# Patient Record
Sex: MALE | Race: WHITE | NOT HISPANIC OR LATINO | Employment: FULL TIME | URBAN - NONMETROPOLITAN AREA
[De-identification: names, ages, dates, MRNs, and addresses within clinical notes are randomized per-mention and may not be internally consistent; named-entity substitution may affect disease eponyms.]

---

## 2021-01-27 ENCOUNTER — OFFICE VISIT (OUTPATIENT)
Dept: INTERNAL MEDICINE | Facility: CLINIC | Age: 41
End: 2021-01-27

## 2021-01-27 VITALS
TEMPERATURE: 98.3 F | HEIGHT: 70 IN | WEIGHT: 268.2 LBS | HEART RATE: 73 BPM | BODY MASS INDEX: 38.39 KG/M2 | DIASTOLIC BLOOD PRESSURE: 87 MMHG | OXYGEN SATURATION: 100 % | SYSTOLIC BLOOD PRESSURE: 152 MMHG | RESPIRATION RATE: 18 BRPM

## 2021-01-27 DIAGNOSIS — E78.2 MIXED HYPERLIPIDEMIA: ICD-10-CM

## 2021-01-27 DIAGNOSIS — E05.90 HYPERTHYROIDISM: Primary | ICD-10-CM

## 2021-01-27 DIAGNOSIS — E04.2 MULTIPLE THYROID NODULES: ICD-10-CM

## 2021-01-27 DIAGNOSIS — D58.2 ELEVATED HEMOGLOBIN (HCC): ICD-10-CM

## 2021-01-27 PROCEDURE — 99204 OFFICE O/P NEW MOD 45 MIN: CPT | Performed by: FAMILY MEDICINE

## 2021-01-27 NOTE — PROGRESS NOTES
"        Subjective     Chief Complaint   Patient presents with   • Hyperthyroidism     Establishing care.        History of Present Illness  Patient is new to the clinic.  He is recently moved from [place] to the [facility].  Overall he feels fine.  He does have a history of subclinical hyperthyroidism with thyroid nodules.  Otherwise he has no known medical problems.  Both of his parents have hypothyroidism.  They are otherwise healthy.  Patient notes he has gained weight since he has been working from home due to this Covid pandemic.  He has previously followed in the past for his hyperthyroidism with an endocrinologist, however; there is not one closely available and he would like to follow with primary care for this unless something occurs that he needs to see an endocrinologist  Patient's PMR from outside medical facility reviewed and noted.    Review of Systems     Otherwise complete ROS reviewed and negative except as mentioned in the HPI.    Past Medical History:   Past Medical History:   Diagnosis Date   • Hyperthyroidism      Past Surgical History:  Past Surgical History:   Procedure Laterality Date   • KNEE ACL RECONSTRUCTION Right    • SINUS SURGERY     • TONSILLECTOMY       Social History:  reports that he quit smoking about 17 years ago. His smoking use included cigarettes. He has a 1.50 pack-year smoking history. He quit smokeless tobacco use about 17 years ago.  His smokeless tobacco use included snuff. He reports current alcohol use. He reports that he does not use drugs.    Family History: family history includes Hypothyroidism in his father and mother.       Allergies:  Allergies   Allergen Reactions   • Penicillins Hives     Medications:  Prior to Admission medications    Not on File       Objective     Vital Signs: /87 (BP Location: Right arm, Patient Position: Sitting, Cuff Size: Large Adult)   Pulse 73   Temp 98.3 °F (36.8 °C) (Skin)   Resp 18   Ht 177.8 cm (70\")   Wt 122 kg (268 " lb 3.2 oz)   SpO2 100%   BMI 38.48 kg/m²   Physical Exam  Vitals signs and nursing note reviewed.   Constitutional:       General: He is not in acute distress.     Appearance: Normal appearance. He is obese. He is not ill-appearing.   HENT:      Head: Normocephalic and atraumatic.      Right Ear: Tympanic membrane, ear canal and external ear normal.      Left Ear: Tympanic membrane, ear canal and external ear normal.      Nose: Nose normal. No congestion.      Mouth/Throat:      Mouth: Mucous membranes are moist.      Pharynx: Oropharynx is clear.   Eyes:      General: No scleral icterus.     Extraocular Movements: Extraocular movements intact.      Conjunctiva/sclera: Conjunctivae normal.      Pupils: Pupils are equal, round, and reactive to light.   Neck:      Musculoskeletal: Normal range of motion and neck supple.   Cardiovascular:      Rate and Rhythm: Normal rate and regular rhythm.      Pulses: Normal pulses.      Heart sounds: Normal heart sounds. No murmur. No friction rub. No gallop.    Pulmonary:      Effort: Pulmonary effort is normal.      Breath sounds: Normal breath sounds.   Abdominal:      General: Bowel sounds are normal. There is no distension.      Palpations: Abdomen is soft.   Musculoskeletal: Normal range of motion.         General: No swelling.      Right lower leg: No edema.      Left lower leg: Edema present.   Skin:     General: Skin is warm and dry.      Capillary Refill: Capillary refill takes less than 2 seconds.   Neurological:      General: No focal deficit present.      Mental Status: He is alert and oriented to person, place, and time.      Deep Tendon Reflexes: Reflexes normal.   Psychiatric:         Mood and Affect: Mood normal.         Behavior: Behavior normal.         Thought Content: Thought content normal.         Judgment: Judgment normal.         Patient's Body mass index is 38.48 kg/m². BMI is above normal parameters. Recommendations include: exercise counseling and  nutrition counseling.      Results Reviewed:  No results found for: GLUCOSE, BUN, CREATININE, NA, K, CL, CO2, CALCIUM, ALT, AST, WBC, HCT, PLT, CHOL, TRIG, HDL, LDL, LDLHDL, HGBA1C      Assessment / Plan     Assessment/Plan:  1. Hyperthyroidism    - TSH; Future  - T4; Future    2. Elevated hemoglobin (CMS/HCC)    - CBC w AUTO Differential; Future    3. Mixed hyperlipidemia    - Comprehensive metabolic panel; Future  - Lipid panel; Future    4. Multiple thyroid nodules    - US Thyroid        Return in about 6 months (around 7/27/2021). unless patient needs to be seen sooner or acute issues arise    I have discussed the patient results/orders and and plan/recommendation with them at today's visit.      Terese Brambila, DO   01/27/2021

## 2021-02-01 ENCOUNTER — CLINICAL SUPPORT (OUTPATIENT)
Dept: INTERNAL MEDICINE | Facility: CLINIC | Age: 41
End: 2021-02-01

## 2021-02-01 DIAGNOSIS — E05.90 HYPERTHYROIDISM: ICD-10-CM

## 2021-02-01 DIAGNOSIS — E78.2 MIXED HYPERLIPIDEMIA: ICD-10-CM

## 2021-02-01 DIAGNOSIS — D58.2 ELEVATED HEMOGLOBIN (HCC): ICD-10-CM

## 2021-02-01 PROCEDURE — 36415 COLL VENOUS BLD VENIPUNCTURE: CPT | Performed by: NURSE PRACTITIONER

## 2021-02-01 NOTE — PROGRESS NOTES
Venipuncture Blood Specimen Collection  Venipuncture performed in left arm by Maria Antonia Olson CMA with good hemostasis. Patient tolerated the procedure well without complications.   02/01/21   Maria Antonia Olson CMA

## 2021-02-02 ENCOUNTER — TELEPHONE (OUTPATIENT)
Dept: INTERNAL MEDICINE | Facility: CLINIC | Age: 41
End: 2021-02-02

## 2021-02-02 LAB
ALBUMIN SERPL-MCNC: 4.5 G/DL (ref 4–5)
ALBUMIN/GLOB SERPL: 2 {RATIO} (ref 1.2–2.2)
ALP SERPL-CCNC: 69 IU/L (ref 39–117)
ALT SERPL-CCNC: 17 IU/L (ref 0–44)
AST SERPL-CCNC: 17 IU/L (ref 0–40)
BASOPHILS # BLD AUTO: 0.1 X10E3/UL (ref 0–0.2)
BASOPHILS NFR BLD AUTO: 1 %
BILIRUB SERPL-MCNC: 0.8 MG/DL (ref 0–1.2)
BUN SERPL-MCNC: 11 MG/DL (ref 6–20)
BUN/CREAT SERPL: 11 (ref 9–20)
CALCIUM SERPL-MCNC: 9.3 MG/DL (ref 8.7–10.2)
CHLORIDE SERPL-SCNC: 103 MMOL/L (ref 96–106)
CHOLEST SERPL-MCNC: 183 MG/DL (ref 100–199)
CO2 SERPL-SCNC: 25 MMOL/L (ref 20–29)
CREAT SERPL-MCNC: 1 MG/DL (ref 0.76–1.27)
EOSINOPHIL # BLD AUTO: 0.1 X10E3/UL (ref 0–0.4)
EOSINOPHIL NFR BLD AUTO: 1 %
ERYTHROCYTE [DISTWIDTH] IN BLOOD BY AUTOMATED COUNT: 12.7 % (ref 11.6–15.4)
GLOBULIN SER CALC-MCNC: 2.2 G/DL (ref 1.5–4.5)
GLUCOSE SERPL-MCNC: 90 MG/DL (ref 65–99)
HCT VFR BLD AUTO: 48.9 % (ref 37.5–51)
HDLC SERPL-MCNC: 55 MG/DL
HGB BLD-MCNC: 16.4 G/DL (ref 13–17.7)
IMM GRANULOCYTES # BLD AUTO: 0 X10E3/UL (ref 0–0.1)
IMM GRANULOCYTES NFR BLD AUTO: 1 %
LDLC SERPL CALC-MCNC: 116 MG/DL (ref 0–99)
LYMPHOCYTES # BLD AUTO: 1.7 X10E3/UL (ref 0.7–3.1)
LYMPHOCYTES NFR BLD AUTO: 35 %
MCH RBC QN AUTO: 30.1 PG (ref 26.6–33)
MCHC RBC AUTO-ENTMCNC: 33.5 G/DL (ref 31.5–35.7)
MCV RBC AUTO: 90 FL (ref 79–97)
MONOCYTES # BLD AUTO: 0.4 X10E3/UL (ref 0.1–0.9)
MONOCYTES NFR BLD AUTO: 9 %
NEUTROPHILS # BLD AUTO: 2.6 X10E3/UL (ref 1.4–7)
NEUTROPHILS NFR BLD AUTO: 53 %
PLATELET # BLD AUTO: 209 X10E3/UL (ref 150–450)
POTASSIUM SERPL-SCNC: 4.4 MMOL/L (ref 3.5–5.2)
PROT SERPL-MCNC: 6.7 G/DL (ref 6–8.5)
RBC # BLD AUTO: 5.45 X10E6/UL (ref 4.14–5.8)
SODIUM SERPL-SCNC: 141 MMOL/L (ref 134–144)
T4 SERPL-MCNC: 7.4 UG/DL (ref 4.5–12)
TRIGL SERPL-MCNC: 66 MG/DL (ref 0–149)
TSH SERPL DL<=0.005 MIU/L-ACNC: 0.47 UIU/ML (ref 0.45–4.5)
VLDLC SERPL CALC-MCNC: 12 MG/DL (ref 5–40)
WBC # BLD AUTO: 4.9 X10E3/UL (ref 3.4–10.8)

## 2021-02-02 NOTE — TELEPHONE ENCOUNTER
----- Message from Terese Brambila DO sent at 2/2/2021 12:25 PM CST -----  Several small nodules in the thyroid gland as described above. None  of these meet criteria for biopsy. Follow-up ultrasound is recommended  in 12 months.

## 2021-02-02 NOTE — TELEPHONE ENCOUNTER
Called patient to discuss thyroid US results and recommendations. Patient voiced understanding and had no further questions.

## 2021-12-07 ENCOUNTER — OFFICE VISIT (OUTPATIENT)
Dept: INTERNAL MEDICINE | Facility: CLINIC | Age: 41
End: 2021-12-07

## 2021-12-07 VITALS
WEIGHT: 271.3 LBS | BODY MASS INDEX: 38.84 KG/M2 | SYSTOLIC BLOOD PRESSURE: 150 MMHG | OXYGEN SATURATION: 99 % | RESPIRATION RATE: 18 BRPM | DIASTOLIC BLOOD PRESSURE: 89 MMHG | HEART RATE: 94 BPM | HEIGHT: 70 IN | TEMPERATURE: 98.5 F

## 2021-12-07 DIAGNOSIS — R13.19 ESOPHAGEAL DYSPHAGIA: Primary | ICD-10-CM

## 2021-12-07 DIAGNOSIS — R03.0 ELEVATED BLOOD PRESSURE READING WITHOUT DIAGNOSIS OF HYPERTENSION: ICD-10-CM

## 2021-12-07 PROCEDURE — 99213 OFFICE O/P EST LOW 20 MIN: CPT | Performed by: FAMILY MEDICINE

## 2021-12-07 RX ORDER — PANTOPRAZOLE SODIUM 40 MG/1
40 TABLET, DELAYED RELEASE ORAL DAILY
Qty: 30 TABLET | Refills: 3 | Status: SHIPPED | OUTPATIENT
Start: 2021-12-07 | End: 2022-03-03 | Stop reason: SDUPTHER

## 2021-12-07 NOTE — PROGRESS NOTES
"        Subjective     Chief Complaint   Patient presents with   • Difficulty Swallowing     Symptoms began one year ago.        History of Present Illness  Difficulty swallowing.  Patient notes he has had problems off and on with this.  Has not had any scopes done in recent years.   The symptoms are worsening.   He is not eating as much due to symptoms.   Feels like he is actually choking and has to cough food up.   BP at home 130/74 usually stays in that range.  Patient's PMR from outside medical facility reviewed and noted.    Review of Systems     Otherwise complete ROS reviewed and negative except as mentioned in the HPI.    Past Medical History:   Past Medical History:   Diagnosis Date   • Hyperthyroidism      Past Surgical History:  Past Surgical History:   Procedure Laterality Date   • KNEE ACL RECONSTRUCTION Right    • SINUS SURGERY     • TONSILLECTOMY       Social History:  reports that he quit smoking about 17 years ago. His smoking use included cigarettes. He has a 1.50 pack-year smoking history. He quit smokeless tobacco use about 17 years ago.  His smokeless tobacco use included snuff. He reports current alcohol use. He reports that he does not use drugs.    Family History: family history includes Hypothyroidism in his father and mother.       Allergies:  Allergies   Allergen Reactions   • Penicillins Hives     Medications:  Prior to Admission medications    Not on File       Objective     Vital Signs: /89 (BP Location: Right arm, Patient Position: Sitting, Cuff Size: Large Adult)   Pulse 94   Temp 98.5 °F (36.9 °C) (Skin)   Resp 18   Ht 177.8 cm (70\")   Wt 123 kg (271 lb 4.8 oz)   SpO2 99%   BMI 38.93 kg/m²   Physical Exam  Vitals and nursing note reviewed.   Constitutional:       General: He is not in acute distress.     Appearance: Normal appearance. He is obese. He is not ill-appearing.   HENT:      Head: Normocephalic and atraumatic.      Right Ear: Tympanic membrane, ear canal and " Telephone Encounter by Curt Cardenas DO at 05/04/18 03:47 PM     Author:  Curt Cardenas DO Service:  (none) Author Type:  Physician     Filed:  05/04/18 03:47 PM Encounter Date:  5/4/2018 Status:  Signed     :  Curt Cardenas DO (Physician)            Echocardiogram shows mildly dilated LV  At least moderate eccentric AI, unchanged from before[FH1.1M]    Electronically Signed by:    Curt Cardenas DO , 5/4/2018[FH1.1T]        Revision History        User Key Date/Time User Provider Type Action    > FH1.1 05/04/18 03:47 PM Curt Cardenas DO Physician Sign    M - Manual, T - Template             external ear normal.      Left Ear: Tympanic membrane, ear canal and external ear normal.      Nose: Nose normal.      Mouth/Throat:      Mouth: Mucous membranes are moist.      Pharynx: Oropharynx is clear.   Eyes:      Extraocular Movements: Extraocular movements intact.      Conjunctiva/sclera: Conjunctivae normal.      Pupils: Pupils are equal, round, and reactive to light.   Cardiovascular:      Rate and Rhythm: Normal rate.      Pulses: Normal pulses.      Heart sounds: Normal heart sounds.   Pulmonary:      Effort: Pulmonary effort is normal.      Breath sounds: Normal breath sounds.   Abdominal:      General: Bowel sounds are normal. There is no distension.      Palpations: Abdomen is soft.      Tenderness: There is no abdominal tenderness.   Musculoskeletal:         General: Normal range of motion.      Cervical back: Normal range of motion.   Skin:     General: Skin is warm and dry.      Capillary Refill: Capillary refill takes less than 2 seconds.   Neurological:      General: No focal deficit present.      Mental Status: He is alert and oriented to person, place, and time.   Psychiatric:         Mood and Affect: Mood normal.         Behavior: Behavior normal.         Patient's Body mass index is 38.93 kg/m².      Results Reviewed:  Glucose   Date Value Ref Range Status   02/01/2021 90 65 - 99 mg/dL Final     BUN   Date Value Ref Range Status   02/01/2021 11 6 - 20 mg/dL Final     Creatinine   Date Value Ref Range Status   02/01/2021 1.00 0.76 - 1.27 mg/dL Final     Sodium   Date Value Ref Range Status   02/01/2021 141 134 - 144 mmol/L Final     Potassium   Date Value Ref Range Status   02/01/2021 4.4 3.5 - 5.2 mmol/L Final     Chloride   Date Value Ref Range Status   02/01/2021 103 96 - 106 mmol/L Final     Total CO2   Date Value Ref Range Status   02/01/2021 25 20 - 29 mmol/L Final     Calcium   Date Value Ref Range Status   02/01/2021 9.3 8.7 - 10.2 mg/dL Final     ALT (SGPT)   Date Value Ref Range  Status   02/01/2021 17 0 - 44 IU/L Final     AST (SGOT)   Date Value Ref Range Status   02/01/2021 17 0 - 40 IU/L Final     WBC   Date Value Ref Range Status   02/01/2021 4.9 3.4 - 10.8 x10E3/uL Final     Hematocrit   Date Value Ref Range Status   02/01/2021 48.9 37.5 - 51.0 % Final     Platelets   Date Value Ref Range Status   02/01/2021 209 150 - 450 x10E3/uL Final     Triglycerides   Date Value Ref Range Status   02/01/2021 66 0 - 149 mg/dL Final     HDL Cholesterol   Date Value Ref Range Status   02/01/2021 55 >39 mg/dL Final     LDL Chol Calc (NIH)   Date Value Ref Range Status   02/01/2021 116 (H) 0 - 99 mg/dL Final         Assessment / Plan     Assessment/Plan:  1. Esophageal dysphagia    - Ambulatory Referral to Gastroenterology  Add protonix 40 mg po qd  If food gets stuck go to [facility]    2. Elevated blood pressure reading without diagnosis of hypertension  Monitor bp at home   Goal less than 130/80        Return in about 4 weeks (around 1/4/2022). unless patient needs to be seen sooner or acute issues arise.        I have discussed the patient results/orders and and plan/recommendation with them at today's visit.      Terese Brambila,    12/07/2021

## 2021-12-16 ENCOUNTER — OFFICE VISIT (OUTPATIENT)
Dept: GASTROENTEROLOGY | Facility: CLINIC | Age: 41
End: 2021-12-16

## 2021-12-16 VITALS
SYSTOLIC BLOOD PRESSURE: 140 MMHG | OXYGEN SATURATION: 99 % | DIASTOLIC BLOOD PRESSURE: 70 MMHG | WEIGHT: 273 LBS | HEART RATE: 81 BPM | HEIGHT: 70 IN | TEMPERATURE: 97.8 F | BODY MASS INDEX: 39.08 KG/M2

## 2021-12-16 DIAGNOSIS — Z78.9 NONSMOKER: ICD-10-CM

## 2021-12-16 DIAGNOSIS — R13.19 ESOPHAGEAL DYSPHAGIA: Primary | ICD-10-CM

## 2021-12-16 DIAGNOSIS — E66.9 OBESITY, UNSPECIFIED OBESITY SEVERITY, UNSPECIFIED OBESITY TYPE: ICD-10-CM

## 2021-12-16 PROCEDURE — 99214 OFFICE O/P EST MOD 30 MIN: CPT | Performed by: CLINICAL NURSE SPECIALIST

## 2021-12-16 NOTE — PROGRESS NOTES
Moe Meléndez  2021  Chief Complaint   Patient presents with   • GI Problem     New patient ref by Dr. Brambila for problems swallowing     Subjective   HPI  Moe Meléndez is a 41 y.o. male who presents with a complaint of difficulty swallowing solids progressive ongoing over the past year. It has been moderate to severe. He has been put on Protonix and that has seemed to help as well as with liquids. No nausea or vomiting. No wt loss. NO fever chills or sweats. No melena. No fever.    Past Medical History:   Diagnosis Date   • Hyperthyroidism      Past Surgical History:   Procedure Laterality Date   • KNEE ACL RECONSTRUCTION Right    • SINUS SURGERY     • TONSILLECTOMY         Outpatient Medications Marked as Taking for the 21 encounter (Office Visit) with Reny Rosa APRN   Medication Sig Dispense Refill   • pantoprazole (Protonix) 40 MG EC tablet Take 1 tablet by mouth Daily. 30 tablet 3     Allergies   Allergen Reactions   • Penicillins Hives     Social History     Socioeconomic History   • Marital status:    Tobacco Use   • Smoking status: Former Smoker     Packs/day: 0.50     Years: 3.00     Pack years: 1.50     Types: Cigarettes     Quit date:      Years since quittin.9   • Smokeless tobacco: Former User     Types: Snuff     Quit date:    Substance and Sexual Activity   • Alcohol use: Yes     Alcohol/week: 0.0 standard drinks     Comment: Occassional, 1-2 cans per month   • Drug use: Never   • Sexual activity: Yes     Family History   Problem Relation Age of Onset   • Hypothyroidism Mother    • Colon polyps Mother    • Hypothyroidism Father    • Colon polyps Father    • Colon cancer Paternal Uncle    • Colon polyps Paternal Uncle      Health Maintenance   Topic Date Due   • ANNUAL PHYSICAL  Never done   • COVID-19 Vaccine (1) Never done   • TDAP/TD VACCINES (2 - Tdap) 2007   • HEPATITIS C SCREENING  Never done   • INFLUENZA VACCINE  2022 (Originally  "8/1/2021)   • LIPID PANEL  02/01/2022   • Pneumococcal Vaccine 0-64  Aged Out     Review of Systems   Constitutional: Negative for activity change, appetite change, chills, diaphoresis, fatigue, fever and unexpected weight change.   HENT: Positive for trouble swallowing. Negative for ear pain, hearing loss, mouth sores, sore throat and voice change.    Eyes: Negative.    Respiratory: Negative for cough, choking, shortness of breath and wheezing.    Cardiovascular: Negative for chest pain and palpitations.   Gastrointestinal: Negative for abdominal pain, blood in stool, constipation, diarrhea, nausea and vomiting.   Endocrine: Negative for cold intolerance and heat intolerance.   Genitourinary: Negative for decreased urine volume, dysuria, frequency, hematuria and urgency.   Musculoskeletal: Negative for back pain, gait problem and myalgias.   Skin: Negative for color change, pallor and rash.   Allergic/Immunologic: Negative for food allergies and immunocompromised state.   Neurological: Negative for dizziness, tremors, seizures, syncope, weakness, light-headedness, numbness and headaches.   Hematological: Negative for adenopathy. Does not bruise/bleed easily.   Psychiatric/Behavioral: Negative for agitation and confusion. The patient is not nervous/anxious.    All other systems reviewed and are negative.    Objective   Vitals:    12/16/21 0935   BP: 140/70   Pulse: 81   Temp: 97.8 °F (36.6 °C)   SpO2: 99%   Weight: 124 kg (273 lb)   Height: 177.8 cm (70\")     Body mass index is 39.17 kg/m².  Physical Exam  Constitutional:       Appearance: He is well-developed.   HENT:      Head: Normocephalic and atraumatic.   Eyes:      Pupils: Pupils are equal, round, and reactive to light.   Neck:      Trachea: No tracheal deviation.   Cardiovascular:      Rate and Rhythm: Normal rate and regular rhythm.      Heart sounds: Normal heart sounds. No murmur heard.  No friction rub. No gallop.    Pulmonary:      Effort: Pulmonary " effort is normal. No respiratory distress.      Breath sounds: Normal breath sounds. No wheezing or rales.   Chest:      Chest wall: No tenderness.   Abdominal:      General: Bowel sounds are normal. There is no distension.      Palpations: Abdomen is soft. Abdomen is not rigid.      Tenderness: There is no abdominal tenderness. There is no guarding or rebound.   Musculoskeletal:         General: No tenderness or deformity. Normal range of motion.      Cervical back: Normal range of motion and neck supple.   Skin:     General: Skin is warm and dry.      Coloration: Skin is not pale.      Findings: No rash.   Neurological:      Mental Status: He is alert and oriented to person, place, and time.      Deep Tendon Reflexes: Reflexes are normal and symmetric.   Psychiatric:         Behavior: Behavior normal.         Thought Content: Thought content normal.         Judgment: Judgment normal.       Assessment/Plan   Diagnoses and all orders for this visit:    1. Esophageal dysphagia (Primary)  -     Cancel: Case Request; Standing  -     Cancel: COVID PRE-OP / PRE-PROCEDURE SCREENING ORDER (NO ISOLATION) - Swab, Nasal Cavity; Future  -     Cancel: Follow Anesthesia Guidelines / Standing Orders; Future  -     Cancel: Obtain Informed Consent; Future  -     Cancel: Case Request  -     Case Request; Standing  -     COVID PRE-OP / PRE-PROCEDURE SCREENING ORDER (NO ISOLATION) - Swab, Nasopharynx; Future  -     Follow Anesthesia Guidelines / Standing Orders; Future  -     Obtain Informed Consent; Future  -     Case Request    2. Nonsmoker    3. Obesity, unspecified obesity severity, unspecified obesity type      ESOPHAGOGASTRODUODENOSCOPY WITH ANESTHESIA (N/A)  Part of this note may be an electronic transcription/translation of spoken language to printed text using the Dragon Dictation System.  Body mass index is 39.17 kg/m².  No follow-ups on file.    Patient's Body mass index is 39.17 kg/m². indicating that he is obese (BMI  >30). Obesity-related health conditions include the following: none. Obesity is unchanged. BMI is is above average; BMI management plan is completed. We discussed portion control and increasing exercise..      All risks, benefits, alternatives, and indications of colonoscopy and/or Endoscopy procedure have been discussed with the patient. Risks to include perforation of the colon requiring possible surgery or colostomy, risk of bleeding from biopsies or removal of colon tissue, possibility of missing a colon polyp or cancer, or adverse drug reaction.  Benefits to include the diagnosis and management of disease of the colon and rectum. Alternatives to include barium enema, radiographic evaluation, lab testing or no intervention. Pt verbalizes understanding and agrees.     Reny Rosa, APRN  12/16/2021  10:15 CST          If you smoke or use tobacco, 4 minutes reading provided  Steps to Quit Smoking  Smoking tobacco can be harmful to your health and can affect almost every organ in your body. Smoking puts you, and those around you, at risk for developing many serious chronic diseases. Quitting smoking is difficult, but it is one of the best things that you can do for your health. It is never too late to quit.  What are the benefits of quitting smoking?  When you quit smoking, you lower your risk of developing serious diseases and conditions, such as:  · Lung cancer or lung disease, such as COPD.  · Heart disease.  · Stroke.  · Heart attack.  · Infertility.  · Osteoporosis and bone fractures.  Additionally, symptoms such as coughing, wheezing, and shortness of breath may get better when you quit. You may also find that you get sick less often because your body is stronger at fighting off colds and infections. If you are pregnant, quitting smoking can help to reduce your chances of having a baby of low birth weight.  How do I get ready to quit?  When you decide to quit smoking, create a plan to make sure that  you are successful. Before you quit:  · Pick a date to quit. Set a date within the next two weeks to give you time to prepare.  · Write down the reasons why you are quitting. Keep this list in places where you will see it often, such as on your bathroom mirror or in your car or wallet.  · Identify the people, places, things, and activities that make you want to smoke (triggers) and avoid them. Make sure to take these actions:  ¨ Throw away all cigarettes at home, at work, and in your car.  ¨ Throw away smoking accessories, such as ashtrays and lighters.  ¨ Clean your car and make sure to empty the ashtray.  ¨ Clean your home, including curtains and carpets.  · Tell your family, friends, and coworkers that you are quitting. Support from your loved ones can make quitting easier.  · Talk with your health care provider about your options for quitting smoking.  · Find out what treatment options are covered by your health insurance.  What strategies can I use to quit smoking?  Talk with your healthcare provider about different strategies to quit smoking. Some strategies include:  · Quitting smoking altogether instead of gradually lessening how much you smoke over a period of time. Research shows that quitting “cold turkey” is more successful than gradually quitting.  · Attending in-person counseling to help you build problem-solving skills. You are more likely to have success in quitting if you attend several counseling sessions. Even short sessions of 10 minutes can be effective.  · Finding resources and support systems that can help you to quit smoking and remain smoke-free after you quit. These resources are most helpful when you use them often. They can include:  ¨ Online chats with a counselor.  ¨ Telephone quitlines.  ¨ Printed self-help materials.  ¨ Support groups or group counseling.  ¨ Text messaging programs.  ¨ Mobile phone applications.  · Taking medicines to help you quit smoking. (If you are pregnant or  breastfeeding, talk with your health care provider first.) Some medicines contain nicotine and some do not. Both types of medicines help with cravings, but the medicines that include nicotine help to relieve withdrawal symptoms. Your health care provider may recommend:  ¨ Nicotine patches, gum, or lozenges.  ¨ Nicotine inhalers or sprays.  ¨ Non-nicotine medicine that is taken by mouth.  Talk with your health care provider about combining strategies, such as taking medicines while you are also receiving in-person counseling. Using these two strategies together makes you more likely to succeed in quitting than if you used either strategy on its own.  If you are pregnant or breastfeeding, talk with your health care provider about finding counseling or other support strategies to quit smoking. Do not take medicine to help you quit smoking unless told to do so by your health care provider.  What things can I do to make it easier to quit?  Quitting smoking might feel overwhelming at first, but there is a lot that you can do to make it easier. Take these important actions:  · Reach out to your family and friends and ask that they support and encourage you during this time. Call telephone quitlines, reach out to support groups, or work with a counselor for support.  · Ask people who smoke to avoid smoking around you.  · Avoid places that trigger you to smoke, such as bars, parties, or smoke-break areas at work.  · Spend time around people who do not smoke.  · Lessen stress in your life, because stress can be a smoking trigger for some people. To lessen stress, try:  ¨ Exercising regularly.  ¨ Deep-breathing exercises.  ¨ Yoga.  ¨ Meditating.  ¨ Performing a body scan. This involves closing your eyes, scanning your body from head to toe, and noticing which parts of your body are particularly tense. Purposefully relax the muscles in those areas.  · Download or purchase mobile phone or tablet apps (applications) that can help  you stick to your quit plan by providing reminders, tips, and encouragement. There are many free apps, such as QuitGuide from the CDC (Centers for Disease Control and Prevention). You can find other support for quitting smoking (smoking cessation) through smokefree.gov and other websites.  How will I feel when I quit smoking?  Within the first 24 hours of quitting smoking, you may start to feel some withdrawal symptoms. These symptoms are usually most noticeable 2-3 days after quitting, but they usually do not last beyond 2-3 weeks. Changes or symptoms that you might experience include:  · Mood swings.  · Restlessness, anxiety, or irritation.  · Difficulty concentrating.  · Dizziness.  · Strong cravings for sugary foods in addition to nicotine.  · Mild weight gain.  · Constipation.  · Nausea.  · Coughing or a sore throat.  · Changes in how your medicines work in your body.  · A depressed mood.  · Difficulty sleeping (insomnia).  After the first 2-3 weeks of quitting, you may start to notice more positive results, such as:  · Improved sense of smell and taste.  · Decreased coughing and sore throat.  · Slower heart rate.  · Lower blood pressure.  · Clearer skin.  · The ability to breathe more easily.  · Fewer sick days.  Quitting smoking is very challenging for most people. Do not get discouraged if you are not successful the first time. Some people need to make many attempts to quit before they achieve long-term success. Do your best to stick to your quit plan, and talk with your health care provider if you have any questions or concerns.  This information is not intended to replace advice given to you by your health care provider. Make sure you discuss any questions you have with your health care provider.  Document Released: 12/12/2002 Document Revised: 08/15/2017 Document Reviewed: 05/03/2016  [employer] Interactive Patient Education © 2017 [employer]

## 2021-12-31 ENCOUNTER — LAB (OUTPATIENT)
Dept: LAB | Facility: HOSPITAL | Age: 41
End: 2021-12-31

## 2021-12-31 DIAGNOSIS — R13.19 ESOPHAGEAL DYSPHAGIA: ICD-10-CM

## 2021-12-31 LAB — SARS-COV-2 ORF1AB RESP QL NAA+PROBE: NOT DETECTED

## 2021-12-31 PROCEDURE — C9803 HOPD COVID-19 SPEC COLLECT: HCPCS

## 2021-12-31 PROCEDURE — U0004 COV-19 TEST NON-CDC HGH THRU: HCPCS

## 2022-01-03 ENCOUNTER — HOSPITAL ENCOUNTER (OUTPATIENT)
Facility: HOSPITAL | Age: 42
Setting detail: HOSPITAL OUTPATIENT SURGERY
Discharge: HOME | End: 2022-01-03
Attending: INTERNAL MEDICINE | Admitting: INTERNAL MEDICINE

## 2022-01-03 ENCOUNTER — ANESTHESIA (OUTPATIENT)
Dept: GASTROENTEROLOGY | Facility: HOSPITAL | Age: 42
End: 2022-01-03

## 2022-01-03 ENCOUNTER — ANESTHESIA EVENT (OUTPATIENT)
Dept: GASTROENTEROLOGY | Facility: HOSPITAL | Age: 42
End: 2022-01-03

## 2022-01-03 VITALS
BODY MASS INDEX: 39.37 KG/M2 | DIASTOLIC BLOOD PRESSURE: 78 MMHG | OXYGEN SATURATION: 98 % | HEART RATE: 77 BPM | TEMPERATURE: 97.6 F | WEIGHT: 275 LBS | HEIGHT: 70 IN | RESPIRATION RATE: 16 BRPM | SYSTOLIC BLOOD PRESSURE: 128 MMHG

## 2022-01-03 DIAGNOSIS — R13.19 ESOPHAGEAL DYSPHAGIA: ICD-10-CM

## 2022-01-03 PROCEDURE — 88305 TISSUE EXAM BY PATHOLOGIST: CPT | Performed by: INTERNAL MEDICINE

## 2022-01-03 PROCEDURE — 43239 EGD BIOPSY SINGLE/MULTIPLE: CPT | Performed by: INTERNAL MEDICINE

## 2022-01-03 PROCEDURE — 25010000002 PROPOFOL 10 MG/ML EMULSION: Performed by: NURSE ANESTHETIST, CERTIFIED REGISTERED

## 2022-01-03 RX ORDER — SODIUM CHLORIDE 0.9 % (FLUSH) 0.9 %
10 SYRINGE (ML) INJECTION AS NEEDED
Status: DISCONTINUED | OUTPATIENT
Start: 2022-01-03 | End: 2022-01-03 | Stop reason: HOSPADM

## 2022-01-03 RX ORDER — PROPOFOL 10 MG/ML
VIAL (ML) INTRAVENOUS AS NEEDED
Status: DISCONTINUED | OUTPATIENT
Start: 2022-01-03 | End: 2022-01-03 | Stop reason: SURG

## 2022-01-03 RX ORDER — LIDOCAINE HYDROCHLORIDE 10 MG/ML
0.5 INJECTION, SOLUTION EPIDURAL; INFILTRATION; INTRACAUDAL; PERINEURAL ONCE AS NEEDED
Status: CANCELLED | OUTPATIENT
Start: 2022-01-03

## 2022-01-03 RX ORDER — SODIUM CHLORIDE 9 MG/ML
500 INJECTION, SOLUTION INTRAVENOUS CONTINUOUS PRN
Status: DISCONTINUED | OUTPATIENT
Start: 2022-01-03 | End: 2022-01-03 | Stop reason: HOSPADM

## 2022-01-03 RX ORDER — LIDOCAINE HYDROCHLORIDE 20 MG/ML
INJECTION, SOLUTION EPIDURAL; INFILTRATION; INTRACAUDAL; PERINEURAL AS NEEDED
Status: DISCONTINUED | OUTPATIENT
Start: 2022-01-03 | End: 2022-01-03 | Stop reason: SURG

## 2022-01-03 RX ADMIN — LIDOCAINE HYDROCHLORIDE 100 MG: 20 INJECTION, SOLUTION EPIDURAL; INFILTRATION; INTRACAUDAL; PERINEURAL at 07:46

## 2022-01-03 RX ADMIN — PROPOFOL 40 MG: 10 INJECTION, EMULSION INTRAVENOUS at 07:46

## 2022-01-03 RX ADMIN — PROPOFOL 40 MG: 10 INJECTION, EMULSION INTRAVENOUS at 07:50

## 2022-01-03 RX ADMIN — PROPOFOL 200 MG: 10 INJECTION, EMULSION INTRAVENOUS at 07:45

## 2022-01-03 RX ADMIN — PROPOFOL 40 MG: 10 INJECTION, EMULSION INTRAVENOUS at 07:52

## 2022-01-03 RX ADMIN — SODIUM CHLORIDE 500 ML: 9 INJECTION, SOLUTION INTRAVENOUS at 07:27

## 2022-01-03 RX ADMIN — PROPOFOL 40 MG: 10 INJECTION, EMULSION INTRAVENOUS at 07:48

## 2022-01-03 NOTE — ANESTHESIA PREPROCEDURE EVALUATION
Anesthesia Evaluation     Patient summary reviewed   no history of anesthetic complications:  NPO Solid Status: > 8 hours  NPO Liquid Status: > 8 hours           Airway   Mallampati: I  TM distance: >3 FB  Neck ROM: full  No difficulty expected  Dental - normal exam     Pulmonary - negative pulmonary ROS and normal exam   Cardiovascular - negative cardio ROS and normal exam  Exercise tolerance: excellent (>7 METS)        Neuro/Psych- negative ROS  GI/Hepatic/Renal/Endo    (+)  GERD poorly controlled,  thyroid problem hypothyroidism    Musculoskeletal (-) negative ROS    Abdominal  - normal exam   Substance History   (+) alcohol use,      OB/GYN          Other - negative ROS                       Anesthesia Plan    ASA 2     MAC     intravenous induction     Anesthetic plan, all risks, benefits, and alternatives have been provided, discussed and informed consent has been obtained with: patient.

## 2022-01-03 NOTE — ANESTHESIA POSTPROCEDURE EVALUATION
"Patient: Moe Meléndez    Procedure Summary     Date: 01/03/22 Room / Location: [facility] ENDOSCOPY 5 / [facility] END[facility]    Anesthesia Start: 0742 Anesthesia Stop: 0802    Procedure: ESOPHAGOGASTRODUODENOSCOPY WITH ANESTHESIA (N/A ) Diagnosis:       Esophageal dysphagia      (Esophageal dysphagia [R13.19])    Surgeons: Sakshi Navarro MD Provider: Reji Pagan CRNA    Anesthesia Type: MAC ASA Status: 2          Anesthesia Type: MAC    Vitals  Vitals Value Taken Time   /78 01/03/22 0816   Temp     Pulse 71 01/03/22 0818   Resp 16 01/03/22 0815   SpO2 99 % 01/03/22 0818   Vitals shown include unvalidated device data.        Post Anesthesia Care and Evaluation    Patient location during evaluation: PACU  Patient participation: complete - patient participated  Level of consciousness: awake and alert  Pain management: adequate  Airway patency: patent  Anesthetic complications: No anesthetic complications    Cardiovascular status: acceptable  Respiratory status: acceptable  Hydration status: acceptable    Comments: Blood pressure 128/78, pulse 77, temperature 97.6 °F (36.4 °C), temperature source Temporal, resp. rate 16, height 177.8 cm (70\"), weight 125 kg (275 lb), SpO2 98 %.    Pt discharged from PACU based on amari score >8      "

## 2022-01-04 LAB
CYTO UR: NORMAL
LAB AP CASE REPORT: NORMAL
PATH REPORT.FINAL DX SPEC: NORMAL
PATH REPORT.GROSS SPEC: NORMAL

## 2022-01-05 PROBLEM — K22.70 BARRETT'S ESOPHAGUS WITHOUT DYSPLASIA: Status: ACTIVE | Noted: 2022-01-05

## 2022-01-05 NOTE — PROGRESS NOTES
I am writing to inform you that biopsies from the esophagus did show changes that occur with long-standing reflux disease.  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  Thompson's disease occurs in those who suffer with acid reflux off and on for many years.     The importance of Thompson's disease is that there is a small risk of those individuals developing cancer of the esophagus later in life.  The risk is typically small, with the majority only having a 1/2-1% lifetime risk.  Therefore, it is important to treat your acid reflux with lifestyle modifications. This includes gradually losing weight to achieve ideal body weight, elevation of the head of bed by 4-6 inches, nothing to eat or drink 3 hours prior to lying down, avoiding tight clothing, stress reduction, tobacco cessation, reduction of alcohol intake, and dietary restrictions (avoiding caffeine, coffee, fatty foods, mints, chocolate, spicy foods and tomato based sauces as much as possible).  You should also take your acid reflux medicine as directed.    By doing the above measures, you will be able to help keep the acid from irritating and damaging your esophagus any further.  This will not absolute prevent you from developing cancer, but it does help.      Please call our office at [phone] if you have any questions or concerns.    Sincerely,        Sakshi Navarro MD

## 2022-03-03 ENCOUNTER — OFFICE VISIT (OUTPATIENT)
Dept: GASTROENTEROLOGY | Facility: CLINIC | Age: 42
End: 2022-03-03

## 2022-03-03 ENCOUNTER — TELEPHONE (OUTPATIENT)
Dept: GASTROENTEROLOGY | Facility: CLINIC | Age: 42
End: 2022-03-03

## 2022-03-03 VITALS
HEART RATE: 78 BPM | SYSTOLIC BLOOD PRESSURE: 132 MMHG | OXYGEN SATURATION: 99 % | DIASTOLIC BLOOD PRESSURE: 76 MMHG | HEIGHT: 70 IN | TEMPERATURE: 97.3 F | BODY MASS INDEX: 39.51 KG/M2 | WEIGHT: 276 LBS

## 2022-03-03 DIAGNOSIS — K22.70 BARRETT'S ESOPHAGUS WITHOUT DYSPLASIA: Primary | ICD-10-CM

## 2022-03-03 DIAGNOSIS — Z83.71 FAMILY HISTORY OF POLYPS IN THE COLON: ICD-10-CM

## 2022-03-03 PROBLEM — Z83.719 FAMILY HISTORY OF POLYPS IN THE COLON: Status: ACTIVE | Noted: 2022-03-03

## 2022-03-03 PROCEDURE — 99214 OFFICE O/P EST MOD 30 MIN: CPT | Performed by: INTERNAL MEDICINE

## 2022-03-03 RX ORDER — PANTOPRAZOLE SODIUM 40 MG/1
40 TABLET, DELAYED RELEASE ORAL DAILY
Qty: 90 TABLET | Refills: 3 | Status: SHIPPED | OUTPATIENT
Start: 2022-03-03 | End: 2022-08-30

## 2022-03-03 NOTE — PROGRESS NOTES
"Primary Physician: Terese Brambila DO    Chief Complaint   Patient presents with   • Follow-up     Pt presents today for dysphagia/Thompson's follow up-had endo 1/3/2022 that showed Thompson's; Pt states he hasn't had any problems swallowing since his procedure        Subjective     Moe Meléndez is a 41 y.o. male.    HPI   Thompson's esophagus  Endoscopy 2022 to evaluate dysphagia showed short segment Thompson's esophagus confirmed on histology. The edges were slightly \"heaped up\". As such I proceeded with biopsies rather than dilation. No malignancy seen. Patient on pantoprazole 40 mg daily.    Family history of colon polyps  Mother, father and borther have had colon polyps. His paternal uncle who has had colon polyps and colon cancer as well.  His last colonoscopy was done at [facility] in [place] in .  He was told he would need a repeat colonoscopy in .        Past Medical History:   Diagnosis Date   • Thompson's esophagus    • Family history of colon cancer    • Family history of colonic polyps    • GERD (gastroesophageal reflux disease)    • Hyperthyroidism        Past Surgical History:   Procedure Laterality Date   • ENDOSCOPY N/A 1/3/2022    Esophageal mucosal changes suspicious for short-segment Thompson's esophagus-biopsied; Small HH; Widely patent Schatzki ring; Normal stomach; Normal examined duodenum   • KNEE ACL RECONSTRUCTION Right    • SINUS SURGERY     • TONSILLECTOMY          Current Outpatient Medications:   •  pantoprazole (Protonix) 40 MG EC tablet, Take 1 tablet by mouth Daily., Disp: 90 tablet, Rfl: 3    Allergies   Allergen Reactions   • Penicillins Hives       Social History     Socioeconomic History   • Marital status:    Tobacco Use   • Smoking status: Former Smoker     Packs/day: 0.50     Years: 3.00     Pack years: 1.50     Types: Cigarettes     Quit date:      Years since quittin.1   • Smokeless tobacco: Former User     Types: Snuff     Quit date:    Vaping Use " "  • Vaping Use: Never used   Substance and Sexual Activity   • Alcohol use: Not Currently     Alcohol/week: 0.0 standard drinks   • Drug use: Never   • Sexual activity: Yes       Family History   Problem Relation Age of Onset   • Hypothyroidism Mother    • Colon polyps Mother         < 60 years of age   • Hypothyroidism Father    • Colon polyps Father         < 60 years of age   • Colon cancer Paternal Uncle         In his 40's   • Colon polyps Paternal Uncle         < 60 years of age   • Colon polyps Brother         < 60 years old   • Esophageal cancer Neg Hx    • Liver cancer Neg Hx    • Liver disease Neg Hx    • Rectal cancer Neg Hx    • Stomach cancer Neg Hx        Review of Systems   Respiratory: Negative for shortness of breath.    Cardiovascular: Negative for chest pain.       Objective     /76 (BP Location: Left arm, Patient Position: Sitting, Cuff Size: Adult)   Pulse 78   Temp 97.3 °F (36.3 °C) (Infrared)   Ht 177.8 cm (70\")   Wt 125 kg (276 lb)   SpO2 99%   BMI 39.60 kg/m²     Physical Exam  Constitutional:       Appearance: He is well-developed.   Pulmonary:      Effort: Pulmonary effort is normal.   Musculoskeletal:         General: Normal range of motion.   Skin:     General: Skin is warm.   Neurological:      Mental Status: He is alert and oriented to person, place, and time.   Psychiatric:         Behavior: Behavior normal.         Lab Results - Last 18 Months   Lab Units 02/01/21  0720   GLUCOSE mg/dL 90   BUN mg/dL 11   CREATININE mg/dL 1.00   SODIUM mmol/L 141   POTASSIUM mmol/L 4.4   CHLORIDE mmol/L 103   TOTAL CO2 mmol/L 25   ALBUMIN g/dL 4.5   ALT (SGPT) IU/L 17   AST (SGOT) IU/L 17   ALK PHOS IU/L 69   BILIRUBIN mg/dL 0.8       Lab Results - Last 18 Months   Lab Units 02/01/21  0720   HEMOGLOBIN g/dL 16.4   HEMATOCRIT % 48.9   MCV fL 90   WBC x10E3/uL 4.9   RDW % 12.7   PLATELETS x10E3/uL 209       Lab Results - Last 18 Months   Lab Units 02/01/21  0720   T4 TOTAL ug/dL 7.4   TSH " uIU/mL 0.468      Final Diagnosis   Distal esophagus, biopsies:  Mild chronic esophagogastritis with intestinal metaplasia.  No evidence of dysplasia or malignancy.   Electronically signed by Brodie Brewster MD on 1/4/2022 at 1213         IMPRESSION/PLAN:    Assessment/Plan      Problem List Items Addressed This Visit        Family History    Family history of polyps in the colon    Overview     Mother, father and borther have had colon polyps. His paternal uncle who has had colon polyps and colon cancer as well.  His last colonoscopy was done at [facility] in [place] in 2019.  He was told he would need a repeat colonoscopy in 5/2024.         Current Assessment & Plan                   Gastrointestinal Abdominal     Thompson's esophagus without dysplasia - Primary    Overview     Seen on endoscopy 1/2022 to assess dysphagia.  1 column of columnar epithelium seen extending upwards and proven to be Thompson's esophagus on biopsy.  I was concerned about the edges but biopsies were normal.  Will repeat endoscopy now that he has been on active medical treatment with pantoprazole to assure that all is well.    Anti-reflux measures were reviewed and discussed with patient.  They were advised to refrain from chocolate, alcohol, smoking, peppermint and caffeine.  They were advised to limit fatty foods, large meals, and eating late at nighttime.  They were advised to reach an ideal body mass index.             Current Assessment & Plan     The risks, benefits, and alternatives of endoscopy were reviewed with the patient today.  Risks including perforation, with or without dilation, possibly requiring surgery.  Additional risks include risk of bleeding.  There is also the risk of a drug reaction or problems with anesthesia.  This will be discussed with the further by the anesthesia team on the day of the procedure. The benefits include the diagnosis and management of disease of the upper digestive tract.  Alternatives to endoscopy  include upper GI series, laboratory testing, radiographic evaluation, or no intervention.  The patient verbalizes understanding and agrees.    In accordance with requirements under the Affordable Care Act, [facility] has provided pricing for all hospital services and items on each of its websites. However, a patient's actual cost may differ based on the services the patient receives to meet individual healthcare needs and based on the benefits provided under the patient’s insurance coverage.           Relevant Medications    pantoprazole (Protonix) 40 MG EC tablet    Other Relevant Orders    Case Request (Completed)    Follow Anesthesia Guidelines / Standing Orders        MEDICAL COMPLEXITY must have 2 out of 3   Moderate Complexity Level 4                                                                                                              1 of the following medical problems:                                                                                               []One chronic illness with mild exacerbation                                                                                [x]Two or more stable chronic illness                                                                                              []One new problem  []One acute illness with systemic symptoms     Complexity of Data  Reviewed (1 out of the 3 following categories)                                             Category 1 tests, documents, historian (must have 3 points)                                                      []Review of prior external records  []Review of results of unique tests  []Ordering unique tests   []Assessment requires an independent historian   Category 2 Interpretation of tests     []Independent interpretation of test read by another doc   Category 3 Discuss Management/tests  []Discussion with external physician     Risk of complications and/or morbidity                                                                                            [x]Prescription Drug Management     []Decision for elective endoscopic procedure                RTC 1 year      Sakshi Navarro MD  03/03/22  15:07 CST    Much of this encounter note is an electronic transcription/translation of spoken language to printed text. The electronic translation of spoken language may permit erroneous, or at times, nonsensical words or phrases to be inadvertently transcribed; although I have reviewed the note for such errors, some may still exist.

## 2022-03-03 NOTE — ASSESSMENT & PLAN NOTE
The risks, benefits, and alternatives of endoscopy were reviewed with the patient today.  Risks including perforation, with or without dilation, possibly requiring surgery.  Additional risks include risk of bleeding.  There is also the risk of a drug reaction or problems with anesthesia.  This will be discussed with the further by the anesthesia team on the day of the procedure. The benefits include the diagnosis and management of disease of the upper digestive tract.  Alternatives to endoscopy include upper GI series, laboratory testing, radiographic evaluation, or no intervention.  The patient verbalizes understanding and agrees.    In accordance with requirements under the Affordable Care Act, [facility] has provided pricing for all hospital services and items on each of its websites. However, a patient's actual cost may differ based on the services the patient receives to meet individual healthcare needs and based on the benefits provided under the patient’s insurance coverage.

## 2022-03-25 DIAGNOSIS — Z01.818 PREOPERATIVE TESTING: Primary | ICD-10-CM

## 2022-03-28 ENCOUNTER — LAB (OUTPATIENT)
Dept: LAB | Facility: HOSPITAL | Age: 42
End: 2022-03-28

## 2022-03-28 LAB — SARS-COV-2 ORF1AB RESP QL NAA+PROBE: NOT DETECTED

## 2022-03-28 PROCEDURE — C9803 HOPD COVID-19 SPEC COLLECT: HCPCS | Performed by: INTERNAL MEDICINE

## 2022-03-28 PROCEDURE — U0004 COV-19 TEST NON-CDC HGH THRU: HCPCS | Performed by: INTERNAL MEDICINE

## 2022-03-30 ENCOUNTER — HOSPITAL ENCOUNTER (OUTPATIENT)
Facility: HOSPITAL | Age: 42
Setting detail: HOSPITAL OUTPATIENT SURGERY
Discharge: HOME | End: 2022-03-30
Attending: INTERNAL MEDICINE | Admitting: INTERNAL MEDICINE

## 2022-03-30 ENCOUNTER — ANESTHESIA (OUTPATIENT)
Dept: GASTROENTEROLOGY | Facility: HOSPITAL | Age: 42
End: 2022-03-30

## 2022-03-30 ENCOUNTER — ANESTHESIA EVENT (OUTPATIENT)
Dept: GASTROENTEROLOGY | Facility: HOSPITAL | Age: 42
End: 2022-03-30

## 2022-03-30 VITALS
TEMPERATURE: 97.6 F | RESPIRATION RATE: 22 BRPM | SYSTOLIC BLOOD PRESSURE: 130 MMHG | OXYGEN SATURATION: 97 % | WEIGHT: 275 LBS | BODY MASS INDEX: 39.37 KG/M2 | HEIGHT: 70 IN | HEART RATE: 68 BPM | DIASTOLIC BLOOD PRESSURE: 87 MMHG

## 2022-03-30 DIAGNOSIS — K22.70 BARRETT'S ESOPHAGUS WITHOUT DYSPLASIA: ICD-10-CM

## 2022-03-30 PROBLEM — R13.19 ESOPHAGEAL DYSPHAGIA: Status: RESOLVED | Noted: 2021-12-16 | Resolved: 2022-03-30

## 2022-03-30 PROCEDURE — 88305 TISSUE EXAM BY PATHOLOGIST: CPT | Performed by: INTERNAL MEDICINE

## 2022-03-30 PROCEDURE — 25010000002 PROPOFOL 10 MG/ML EMULSION: Performed by: NURSE ANESTHETIST, CERTIFIED REGISTERED

## 2022-03-30 PROCEDURE — 43239 EGD BIOPSY SINGLE/MULTIPLE: CPT | Performed by: INTERNAL MEDICINE

## 2022-03-30 RX ORDER — LIDOCAINE HYDROCHLORIDE 20 MG/ML
INJECTION, SOLUTION EPIDURAL; INFILTRATION; INTRACAUDAL; PERINEURAL AS NEEDED
Status: DISCONTINUED | OUTPATIENT
Start: 2022-03-30 | End: 2022-03-30 | Stop reason: SURG

## 2022-03-30 RX ORDER — PROPOFOL 10 MG/ML
VIAL (ML) INTRAVENOUS AS NEEDED
Status: DISCONTINUED | OUTPATIENT
Start: 2022-03-30 | End: 2022-03-30 | Stop reason: SURG

## 2022-03-30 RX ORDER — SODIUM CHLORIDE 0.9 % (FLUSH) 0.9 %
10 SYRINGE (ML) INJECTION AS NEEDED
Status: DISCONTINUED | OUTPATIENT
Start: 2022-03-30 | End: 2022-03-30 | Stop reason: HOSPADM

## 2022-03-30 RX ORDER — LIDOCAINE HYDROCHLORIDE 10 MG/ML
0.5 INJECTION, SOLUTION EPIDURAL; INFILTRATION; INTRACAUDAL; PERINEURAL ONCE AS NEEDED
Status: CANCELLED | OUTPATIENT
Start: 2022-03-30

## 2022-03-30 RX ORDER — SODIUM CHLORIDE 9 MG/ML
500 INJECTION, SOLUTION INTRAVENOUS CONTINUOUS PRN
Status: DISCONTINUED | OUTPATIENT
Start: 2022-03-30 | End: 2022-03-30 | Stop reason: HOSPADM

## 2022-03-30 RX ADMIN — PROPOFOL 100 MG: 10 INJECTION, EMULSION INTRAVENOUS at 11:38

## 2022-03-30 RX ADMIN — PROPOFOL 200 MG: 10 INJECTION, EMULSION INTRAVENOUS at 11:37

## 2022-03-30 RX ADMIN — SODIUM CHLORIDE 500 ML: 9 INJECTION, SOLUTION INTRAVENOUS at 08:58

## 2022-03-30 RX ADMIN — LIDOCAINE HYDROCHLORIDE 100 MG: 20 INJECTION, SOLUTION EPIDURAL; INFILTRATION; INTRACAUDAL; PERINEURAL at 11:37

## 2022-03-30 NOTE — ANESTHESIA PREPROCEDURE EVALUATION
Anesthesia Evaluation     Patient summary reviewed   no history of anesthetic complications:  NPO Solid Status: > 8 hours             Airway   Mallampati: II  Dental      Pulmonary - negative pulmonary ROS   Cardiovascular - negative cardio ROS  Exercise tolerance: excellent (>7 METS)        Neuro/Psych- negative ROS  GI/Hepatic/Renal/Endo    (+) obesity,  GERD,      Musculoskeletal     Abdominal    Substance History      OB/GYN          Other                        Anesthesia Plan    ASA 2     MAC       Anesthetic plan, all risks, benefits, and alternatives have been provided, discussed and informed consent has been obtained with: patient.        CODE STATUS:

## 2022-03-30 NOTE — ANESTHESIA POSTPROCEDURE EVALUATION
Patient: Moe Meléndez    Procedure Summary     Date: 03/30/22 Room / Location: [facility] ENDOSCOPY 5 / [facility] ENDOSCOPY    Anesthesia Start: 1136 Anesthesia Stop: 1140    Procedure: ESOPHAGOGASTRODUODENOSCOPY WITH ANESTHESIA (N/A ) Diagnosis:       Thompson's esophagus without dysplasia      (Thompson's esophagus without dysplasia [K22.70])    Surgeons: Sakshi Navarro MD Provider: Luis Angel Levy CRNA    Anesthesia Type: MAC ASA Status: 2          Anesthesia Type: MAC    Vitals  Vitals Value Taken Time   /83 03/30/22 1143   Temp     Pulse 92 03/30/22 1146   Resp 21 03/30/22 1143   SpO2 93 % 03/30/22 1146   Vitals shown include unvalidated device data.        Post Anesthesia Care and Evaluation    Patient location during evaluation: PHASE II  Patient participation: complete - patient participated  Level of consciousness: awake and alert  Pain management: adequate  Airway patency: patent  Anesthetic complications: No anesthetic complications  PONV Status: none  Cardiovascular status: acceptable and stable  Respiratory status: acceptable and unassisted  Hydration status: acceptable

## 2022-04-04 LAB
CYTO UR: NORMAL
LAB AP CASE REPORT: NORMAL
LAB AP CLINICAL INFORMATION: NORMAL
PATH REPORT.FINAL DX SPEC: NORMAL
PATH REPORT.GROSS SPEC: NORMAL

## 2022-04-04 NOTE — PROGRESS NOTES
I am writing to inform you that biopsies from the esophagus did show Thompson's esophagus.  Thompson's disease occurs in those who suffer with acid reflux off and on for many years.     Because your biopsies didn't show any precancerous changes, you will need an endoscopy examination again in 3 years.     Please call our office at [phone] if you have any questions or concerns.    Sincerely,    Sakshi Navarro MD

## 2022-08-30 ENCOUNTER — OFFICE VISIT (OUTPATIENT)
Dept: INTERNAL MEDICINE | Facility: CLINIC | Age: 42
End: 2022-08-30

## 2022-08-30 VITALS
TEMPERATURE: 98.2 F | OXYGEN SATURATION: 98 % | SYSTOLIC BLOOD PRESSURE: 137 MMHG | RESPIRATION RATE: 16 BRPM | HEIGHT: 70 IN | WEIGHT: 243 LBS | HEART RATE: 83 BPM | BODY MASS INDEX: 34.79 KG/M2 | DIASTOLIC BLOOD PRESSURE: 88 MMHG

## 2022-08-30 DIAGNOSIS — Z11.59 ENCOUNTER FOR HEPATITIS C SCREENING TEST FOR LOW RISK PATIENT: ICD-10-CM

## 2022-08-30 DIAGNOSIS — Z00.00 ROUTINE GENERAL MEDICAL EXAMINATION AT A HEALTH CARE FACILITY: Primary | ICD-10-CM

## 2022-08-30 PROCEDURE — 99395 PREV VISIT EST AGE 18-39: CPT | Performed by: NURSE PRACTITIONER

## 2022-08-30 NOTE — PROGRESS NOTES
Subjective     Chief Complaint   Patient presents with   • Annual Exam       History of Present Illness  Pt comes in today for his annual exam. He has lost weight intentionally and is overall doing well.   Patient's PMR from outside medical facility reviewed and noted.    Review of Systems   Constitutional: Negative for fatigue and fever.   HENT: Negative for ear pain and sinus pain.    Eyes: Negative for photophobia and visual disturbance ( LAST EYE EXAM WAS 2021).   Respiratory: Negative for cough and shortness of breath.    Cardiovascular: Negative for chest pain.   Gastrointestinal: Negative for anal bleeding, blood in stool and diarrhea.   Endocrine: Negative for polydipsia, polyphagia and polyuria.   Genitourinary: Negative for genital sores and testicular pain.   Musculoskeletal: Negative for gait problem and myalgias.   Skin: Negative.    Allergic/Immunologic: Negative for immunocompromised state.   Neurological: Negative for seizures, syncope and headaches.   Hematological: Negative for adenopathy.   Psychiatric/Behavioral: Negative for confusion, hallucinations and sleep disturbance. The patient is not hyperactive.       Past Medical History:   Past Medical History:   Diagnosis Date   • Bill's esophagus    • Bill's esophagus    • Family history of colon cancer    • Family history of colonic polyps    • GERD (gastroesophageal reflux disease)    • Hyperthyroidism      Past Surgical History:  Past Surgical History:   Procedure Laterality Date   • ENDOSCOPY N/A 1/3/2022    Esophageal mucosal changes suspicious for short-segment Bill's esophagus-biopsied; Small HH; Widely patent Schatzki ring; Normal stomach; Normal examined duodenum   • ENDOSCOPY N/A 3/30/2022    Procedure: ESOPHAGOGASTRODUODENOSCOPY WITH ANESTHESIA;  Surgeon: Sakshi Navarro MD;  Location: [facility] ENDOSCOPY;  Service: Gastroenterology;  Laterality: N/A;  pre bill's  post Terese iSn DO   • KNEE ACL  "RECONSTRUCTION Right    • SINUS SURGERY     • TONSILLECTOMY       Social History:  reports that he quit smoking about 18 years ago. His smoking use included cigarettes. He has a 1.50 pack-year smoking history. He quit smokeless tobacco use about 18 years ago.  His smokeless tobacco use included snuff. He reports previous alcohol use. He reports that he does not use drugs.    Family History: family history includes Colon cancer in his paternal uncle; Colon polyps in his brother, father, mother, and paternal uncle; Hypothyroidism in his father and mother.       Allergies:  Allergies   Allergen Reactions   • Penicillins Hives     Medications:  Prior to Admission medications    Medication Sig Start Date End Date Taking? Authorizing Provider   pantoprazole (Protonix) 40 MG EC tablet Take 1 tablet by mouth Daily. 3/3/22 8/30/22  Sakshi Navarro MD       Objective     Vital Signs: /89   Pulse 83   Temp 98.2 °F (36.8 °C)   Resp 16   Ht 177.8 cm (70\")   Wt 110 kg (243 lb)   SpO2 98%   BMI 34.87 kg/m²   Physical Exam  Constitutional:       Appearance: He is well-developed.   HENT:      Head: Normocephalic and atraumatic.   Eyes:      General: No scleral icterus.     Conjunctiva/sclera: Conjunctivae normal.      Pupils: Pupils are equal, round, and reactive to light.   Neck:      Vascular: No JVD.   Cardiovascular:      Rate and Rhythm: Normal rate and regular rhythm.      Heart sounds: Normal heart sounds. No murmur heard.    No friction rub. No gallop.   Pulmonary:      Effort: Pulmonary effort is normal.      Breath sounds: Normal breath sounds. No wheezing or rales.   Abdominal:      General: Bowel sounds are normal. There is no distension.      Palpations: Abdomen is soft. There is no mass.      Tenderness: There is no abdominal tenderness. There is no guarding or rebound.   Musculoskeletal:         General: Normal range of motion.      Cervical back: Neck supple.      Comments: No cyanosis or clubbing "   Lymphadenopathy:      Cervical: No cervical adenopathy.   Skin:     General: Skin is warm and dry.   Neurological:      Mental Status: He is alert and oriented to person, place, and time.      Cranial Nerves: No cranial nerve deficit.   Psychiatric:         Behavior: Behavior normal.         BMI is >= 30 and <35. (Class 1 Obesity). The following options were offered after discussion;: nutrition counseling/recommendations      Results Reviewed:  Glucose   Date Value Ref Range Status   02/01/2021 90 65 - 99 mg/dL Final     BUN   Date Value Ref Range Status   02/01/2021 11 6 - 20 mg/dL Final     Creatinine   Date Value Ref Range Status   02/01/2021 1.00 0.76 - 1.27 mg/dL Final     Sodium   Date Value Ref Range Status   02/01/2021 141 134 - 144 mmol/L Final     Potassium   Date Value Ref Range Status   02/01/2021 4.4 3.5 - 5.2 mmol/L Final     Chloride   Date Value Ref Range Status   02/01/2021 103 96 - 106 mmol/L Final     Total CO2   Date Value Ref Range Status   02/01/2021 25 20 - 29 mmol/L Final     Calcium   Date Value Ref Range Status   02/01/2021 9.3 8.7 - 10.2 mg/dL Final     ALT (SGPT)   Date Value Ref Range Status   02/01/2021 17 0 - 44 IU/L Final     AST (SGOT)   Date Value Ref Range Status   02/01/2021 17 0 - 40 IU/L Final     WBC   Date Value Ref Range Status   02/01/2021 4.9 3.4 - 10.8 x10E3/uL Final     Hematocrit   Date Value Ref Range Status   02/01/2021 48.9 37.5 - 51.0 % Final     Platelets   Date Value Ref Range Status   02/01/2021 209 150 - 450 x10E3/uL Final     Triglycerides   Date Value Ref Range Status   02/01/2021 66 0 - 149 mg/dL Final     HDL Cholesterol   Date Value Ref Range Status   02/01/2021 55 >39 mg/dL Final     LDL Chol Calc (NIH)   Date Value Ref Range Status   02/01/2021 116 (H) 0 - 99 mg/dL Final         Assessment / Plan     Assessment/Plan:  Diagnoses and all orders for this visit:    1. Routine general medical examination at a health care facility (Primary)  -     TSH  -      T4, free  -     CBC & Differential  -     Comprehensive Metabolic Panel  -     Hemoglobin A1c  -     Lipid Panel    2. Encounter for hepatitis C screening test for low risk patient  -     Hepatitis C Antibody        Will have lab work here today. Discussed testicular self exams, patient is aware how to do testicular exams and the importance of same. Discussed weight management and importance of maintaining a healthy weight. Discussed Vitamin D intake and the importance of adequate vitamin D for both Bone Health and a healthy immune system.  Discussed daily exercise and the importance of same, in regards to a healthy heart as well as helping to maintain his weight and improving his mental health. Colonoscopy is up to date..     Return in about 1 year (around 8/30/2023). unless patient needs to be seen sooner or acute issues arise.    Code Status: Full.     I have discussed the patient results/orders and and plan/recommendation with them at today's visit.      Reny Brown, APRN   08/30/2022

## 2022-08-31 LAB
ALBUMIN SERPL-MCNC: 4.7 G/DL (ref 4–5)
ALBUMIN/GLOB SERPL: 2 {RATIO} (ref 1.2–2.2)
ALP SERPL-CCNC: 72 IU/L (ref 44–121)
ALT SERPL-CCNC: 20 IU/L (ref 0–44)
AST SERPL-CCNC: 24 IU/L (ref 0–40)
BASOPHILS # BLD AUTO: 0.1 X10E3/UL (ref 0–0.2)
BASOPHILS NFR BLD AUTO: 1 %
BILIRUB SERPL-MCNC: 0.7 MG/DL (ref 0–1.2)
BUN SERPL-MCNC: 13 MG/DL (ref 6–20)
BUN/CREAT SERPL: 16 (ref 9–20)
CALCIUM SERPL-MCNC: 9.3 MG/DL (ref 8.7–10.2)
CHLORIDE SERPL-SCNC: 102 MMOL/L (ref 96–106)
CHOLEST SERPL-MCNC: 176 MG/DL (ref 100–199)
CO2 SERPL-SCNC: 20 MMOL/L (ref 20–29)
CREAT SERPL-MCNC: 0.83 MG/DL (ref 0.76–1.27)
EGFRCR-CYS SERPLBLD CKD-EPI 2021: 114 ML/MIN/1.73
EOSINOPHIL # BLD AUTO: 0.1 X10E3/UL (ref 0–0.4)
EOSINOPHIL NFR BLD AUTO: 2 %
ERYTHROCYTE [DISTWIDTH] IN BLOOD BY AUTOMATED COUNT: 12.5 % (ref 11.6–15.4)
GLOBULIN SER CALC-MCNC: 2.3 G/DL (ref 1.5–4.5)
GLUCOSE SERPL-MCNC: 78 MG/DL (ref 65–99)
HBA1C MFR BLD: 5.2 % (ref 4.8–5.6)
HCT VFR BLD AUTO: 49.5 % (ref 37.5–51)
HCV AB S/CO SERPL IA: 0.2 S/CO RATIO (ref 0–0.9)
HDLC SERPL-MCNC: 48 MG/DL
HGB BLD-MCNC: 16.5 G/DL (ref 13–17.7)
IMM GRANULOCYTES # BLD AUTO: 0 X10E3/UL (ref 0–0.1)
IMM GRANULOCYTES NFR BLD AUTO: 1 %
LDLC SERPL CALC-MCNC: 116 MG/DL (ref 0–99)
LYMPHOCYTES # BLD AUTO: 1.5 X10E3/UL (ref 0.7–3.1)
LYMPHOCYTES NFR BLD AUTO: 29 %
MCH RBC QN AUTO: 29.8 PG (ref 26.6–33)
MCHC RBC AUTO-ENTMCNC: 33.3 G/DL (ref 31.5–35.7)
MCV RBC AUTO: 90 FL (ref 79–97)
MONOCYTES # BLD AUTO: 0.5 X10E3/UL (ref 0.1–0.9)
MONOCYTES NFR BLD AUTO: 9 %
NEUTROPHILS # BLD AUTO: 3.1 X10E3/UL (ref 1.4–7)
NEUTROPHILS NFR BLD AUTO: 58 %
PLATELET # BLD AUTO: 202 X10E3/UL (ref 150–450)
POTASSIUM SERPL-SCNC: 4.4 MMOL/L (ref 3.5–5.2)
PROT SERPL-MCNC: 7 G/DL (ref 6–8.5)
RBC # BLD AUTO: 5.53 X10E6/UL (ref 4.14–5.8)
SODIUM SERPL-SCNC: 140 MMOL/L (ref 134–144)
T4 FREE SERPL-MCNC: 1.33 NG/DL (ref 0.82–1.77)
TRIGL SERPL-MCNC: 64 MG/DL (ref 0–149)
TSH SERPL DL<=0.005 MIU/L-ACNC: 0.55 UIU/ML (ref 0.45–4.5)
VLDLC SERPL CALC-MCNC: 12 MG/DL (ref 5–40)
WBC # BLD AUTO: 5.2 X10E3/UL (ref 3.4–10.8)

## 2023-08-31 ENCOUNTER — OFFICE VISIT (OUTPATIENT)
Dept: INTERNAL MEDICINE | Facility: CLINIC | Age: 43
End: 2023-08-31
Payer: COMMERCIAL

## 2023-08-31 VITALS
DIASTOLIC BLOOD PRESSURE: 84 MMHG | RESPIRATION RATE: 18 BRPM | WEIGHT: 240 LBS | TEMPERATURE: 97.9 F | SYSTOLIC BLOOD PRESSURE: 127 MMHG | HEIGHT: 70 IN | BODY MASS INDEX: 34.36 KG/M2 | HEART RATE: 58 BPM | OXYGEN SATURATION: 99 %

## 2023-08-31 DIAGNOSIS — Z00.00 ROUTINE GENERAL MEDICAL EXAMINATION AT A HEALTH CARE FACILITY: Primary | ICD-10-CM

## 2023-08-31 PROBLEM — K52.9 CHRONIC DIARRHEA: Status: ACTIVE | Noted: 2019-05-28

## 2023-08-31 PROBLEM — R10.10 PAIN OF UPPER ABDOMEN: Status: ACTIVE | Noted: 2019-05-28

## 2023-08-31 PROBLEM — E04.1 THYROID NODULE: Status: ACTIVE | Noted: 2017-11-29

## 2023-08-31 PROBLEM — J30.2 CHRONIC SEASONAL ALLERGIC RHINITIS: Status: ACTIVE | Noted: 2017-12-27

## 2023-08-31 PROBLEM — Z80.0 FAMILY HX OF COLON CANCER: Status: ACTIVE | Noted: 2019-05-28

## 2023-08-31 PROBLEM — R10.9 ABDOMINAL PAIN: Status: ACTIVE | Noted: 2019-05-28

## 2023-08-31 PROBLEM — E05.90 SUBCLINICAL HYPERTHYROIDISM: Status: ACTIVE | Noted: 2017-11-29

## 2023-08-31 PROCEDURE — 99396 PREV VISIT EST AGE 40-64: CPT | Performed by: NURSE PRACTITIONER

## 2023-08-31 NOTE — PROGRESS NOTES
Subjective     Chief Complaint   Patient presents with    Annual Exam       History of Present Illness  Patient presents to the clinic for yearly physical. Patient reports ear pain and pressure and nasal congestion.   Patient's PMR from outside medical facility reviewed and noted.    Review of Systems   Constitutional:  Negative for fatigue.   HENT:  Positive for ear pain, postnasal drip and sinus pressure.    Eyes:  Negative for visual disturbance.   Respiratory:  Negative for cough, shortness of breath and wheezing.    Cardiovascular:  Negative for chest pain and palpitations.   Gastrointestinal:  Negative for blood in stool, constipation, diarrhea, nausea and vomiting.   Endocrine: Negative for polydipsia, polyphagia and polyuria.   Genitourinary:  Negative for difficulty urinating, flank pain and genital sores.   Musculoskeletal: Negative.  Negative for gait problem.   Skin:  Negative for rash.   Neurological:  Negative for syncope, weakness and headaches.   Psychiatric/Behavioral: Negative.        Otherwise complete ROS reviewed and negative except as mentioned in the HPI.    Past Medical History:   Past Medical History:   Diagnosis Date    Bill's esophagus     Bill's esophagus     Family history of colon cancer     Family history of colonic polyps     GERD (gastroesophageal reflux disease)     Hyperthyroidism      Past Surgical History:  Past Surgical History:   Procedure Laterality Date    ENDOSCOPY N/A 1/3/2022    Esophageal mucosal changes suspicious for short-segment Bill's esophagus-biopsied; Small HH; Widely patent Schatzki ring; Normal stomach; Normal examined duodenum    ENDOSCOPY N/A 3/30/2022    Procedure: ESOPHAGOGASTRODUODENOSCOPY WITH ANESTHESIA;  Surgeon: Sakshi Navarro MD;  Location: [facility] ENDOSCOPY;  Service: Gastroenterology;  Laterality: N/A;  pre bill's  post Terese Sin DO    KNEE ACL RECONSTRUCTION Right     SINUS SURGERY      TONSILLECTOMY    "    Social History:  reports that he quit smoking about 19 years ago. His smoking use included cigarettes. He has a 1.50 pack-year smoking history. He quit smokeless tobacco use about 19 years ago.  His smokeless tobacco use included snuff. He reports that he does not currently use alcohol. He reports that he does not use drugs.    Family History: family history includes Colon cancer in his paternal uncle; Colon polyps in his brother, father, mother, and paternal uncle; Hypothyroidism in his father and mother.       Allergies:  Allergies   Allergen Reactions    Penicillins Hives     Medications:  Prior to Admission medications    Not on File       Objective     Vital Signs: /84   Pulse 58   Temp 97.9 øF (36.6 øC)   Resp 18   Ht 177.8 cm (70\")   Wt 109 kg (240 lb)   SpO2 99%   BMI 34.44 kg/mý   Physical Exam  Vitals reviewed.   Constitutional:       Appearance: He is well-developed.   HENT:      Head: Normocephalic and atraumatic.      Right Ear: Tympanic membrane normal.      Nose: Congestion present.   Eyes:      Pupils: Pupils are equal, round, and reactive to light.   Neck:      Vascular: No JVD.   Cardiovascular:      Rate and Rhythm: Normal rate and regular rhythm.      Heart sounds: No murmur heard.  Pulmonary:      Effort: Pulmonary effort is normal.      Breath sounds: Normal breath sounds.   Abdominal:      General: Bowel sounds are normal.      Palpations: Abdomen is soft.   Musculoskeletal:         General: No deformity. Normal range of motion.      Cervical back: Normal range of motion and neck supple.   Lymphadenopathy:      Cervical: No cervical adenopathy.   Skin:     General: Skin is warm and dry.   Neurological:      Mental Status: He is alert and oriented to person, place, and time.   Psychiatric:         Behavior: Behavior normal.         Thought Content: Thought content normal.         Judgment: Judgment normal.       BMI is >= 30 and <35. (Class 1 Obesity). The following options " were offered after discussion;: exercise counseling/recommendations and nutrition counseling/recommendations      Results Reviewed:  Glucose   Date Value Ref Range Status   08/30/2022 78 65 - 99 mg/dL Final     BUN   Date Value Ref Range Status   08/30/2022 13 6 - 20 mg/dL Final     Creatinine   Date Value Ref Range Status   08/30/2022 0.83 0.76 - 1.27 mg/dL Final     Sodium   Date Value Ref Range Status   08/30/2022 140 134 - 144 mmol/L Final     Potassium   Date Value Ref Range Status   08/30/2022 4.4 3.5 - 5.2 mmol/L Final     Chloride   Date Value Ref Range Status   08/30/2022 102 96 - 106 mmol/L Final     Total CO2   Date Value Ref Range Status   08/30/2022 20 20 - 29 mmol/L Final     Calcium   Date Value Ref Range Status   08/30/2022 9.3 8.7 - 10.2 mg/dL Final     ALT (SGPT)   Date Value Ref Range Status   08/30/2022 20 0 - 44 IU/L Final     AST (SGOT)   Date Value Ref Range Status   08/30/2022 24 0 - 40 IU/L Final     WBC   Date Value Ref Range Status   08/30/2022 5.2 3.4 - 10.8 x10E3/uL Final     Hematocrit   Date Value Ref Range Status   08/30/2022 49.5 37.5 - 51.0 % Final     Platelets   Date Value Ref Range Status   08/30/2022 202 150 - 450 x10E3/uL Final     Triglycerides   Date Value Ref Range Status   08/30/2022 64 0 - 149 mg/dL Final     HDL Cholesterol   Date Value Ref Range Status   08/30/2022 48 >39 mg/dL Final     LDL Chol Calc (NIH)   Date Value Ref Range Status   08/30/2022 116 (H) 0 - 99 mg/dL Final     Hemoglobin A1C   Date Value Ref Range Status   08/30/2022 5.2 4.8 - 5.6 % Final     Comment:              Prediabetes: 5.7 - 6.4           Diabetes: >6.4           Glycemic control for adults with diabetes: <7.0           Assessment / Plan     Assessment/Plan:  Diagnoses and all orders for this visit:    1. Routine general medical examination at a health care facility (Primary)  -     CBC & Differential  -     Comprehensive Metabolic Panel  -     Lipid Panel  -     TSH  -     Vitamin B12  -      Vitamin D,25-Hydroxy  -     Hemoglobin A1c      Will have lab work here today. Discussed testicular self exams, patient is aware how to do testicular exams and the importance of same. Discussed weight management and importance of maintaining a healthy weight. Discussed Vitamin D intake and the importance of adequate vitamin D for both Bone Health and a healthy immune system.  Discussed daily exercise and the importance of same, in regards to a healthy heart as well as helping to maintain his weight.  BMI 34.44  Colonoscopy is up to date.    Return in about 1 year (around 8/31/2024) for Annual physical. unless patient needs to be seen sooner or acute issues arise.    Code Status: Full.     I have discussed the patient results/orders and and plan/recommendation with them at today's visit.      Reny Brown, APRN   08/31/2023

## 2023-09-01 LAB
25(OH)D3+25(OH)D2 SERPL-MCNC: 58.4 NG/ML (ref 30–100)
ALBUMIN SERPL-MCNC: 4.2 G/DL (ref 4.1–5.1)
ALBUMIN/GLOB SERPL: 1.8 {RATIO} (ref 1.2–2.2)
ALP SERPL-CCNC: 60 IU/L (ref 44–121)
ALT SERPL-CCNC: 19 IU/L (ref 0–44)
AST SERPL-CCNC: 20 IU/L (ref 0–40)
BASOPHILS # BLD AUTO: 0 X10E3/UL (ref 0–0.2)
BASOPHILS NFR BLD AUTO: 0 %
BILIRUB SERPL-MCNC: 0.6 MG/DL (ref 0–1.2)
BUN SERPL-MCNC: 14 MG/DL (ref 6–24)
BUN/CREAT SERPL: 16 (ref 9–20)
CALCIUM SERPL-MCNC: 8.6 MG/DL (ref 8.7–10.2)
CHLORIDE SERPL-SCNC: 103 MMOL/L (ref 96–106)
CHOLEST SERPL-MCNC: 148 MG/DL (ref 100–199)
CO2 SERPL-SCNC: 23 MMOL/L (ref 20–29)
CREAT SERPL-MCNC: 0.89 MG/DL (ref 0.76–1.27)
EGFRCR SERPLBLD CKD-EPI 2021: 111 ML/MIN/1.73
EOSINOPHIL # BLD AUTO: 0.1 X10E3/UL (ref 0–0.4)
EOSINOPHIL NFR BLD AUTO: 2 %
ERYTHROCYTE [DISTWIDTH] IN BLOOD BY AUTOMATED COUNT: 12.4 % (ref 11.6–15.4)
GLOBULIN SER CALC-MCNC: 2.4 G/DL (ref 1.5–4.5)
GLUCOSE SERPL-MCNC: 88 MG/DL (ref 70–99)
HBA1C MFR BLD: 5.3 % (ref 4.8–5.6)
HCT VFR BLD AUTO: 49.3 % (ref 37.5–51)
HDLC SERPL-MCNC: 52 MG/DL
HGB BLD-MCNC: 16.7 G/DL (ref 13–17.7)
IMM GRANULOCYTES # BLD AUTO: 0 X10E3/UL (ref 0–0.1)
IMM GRANULOCYTES NFR BLD AUTO: 0 %
LDLC SERPL CALC-MCNC: 86 MG/DL (ref 0–99)
LYMPHOCYTES # BLD AUTO: 0.6 X10E3/UL (ref 0.7–3.1)
LYMPHOCYTES NFR BLD AUTO: 10 %
MCH RBC QN AUTO: 31 PG (ref 26.6–33)
MCHC RBC AUTO-ENTMCNC: 33.9 G/DL (ref 31.5–35.7)
MCV RBC AUTO: 92 FL (ref 79–97)
MONOCYTES # BLD AUTO: 0.4 X10E3/UL (ref 0.1–0.9)
MONOCYTES NFR BLD AUTO: 8 %
NEUTROPHILS # BLD AUTO: 4.4 X10E3/UL (ref 1.4–7)
NEUTROPHILS NFR BLD AUTO: 80 %
PLATELET # BLD AUTO: 199 X10E3/UL (ref 150–450)
POTASSIUM SERPL-SCNC: 4.5 MMOL/L (ref 3.5–5.2)
PROT SERPL-MCNC: 6.6 G/DL (ref 6–8.5)
RBC # BLD AUTO: 5.39 X10E6/UL (ref 4.14–5.8)
SODIUM SERPL-SCNC: 140 MMOL/L (ref 134–144)
TRIGL SERPL-MCNC: 43 MG/DL (ref 0–149)
TSH SERPL DL<=0.005 MIU/L-ACNC: 0.17 UIU/ML (ref 0.45–4.5)
VIT B12 SERPL-MCNC: 1111 PG/ML (ref 232–1245)
VLDLC SERPL CALC-MCNC: 10 MG/DL (ref 5–40)
WBC # BLD AUTO: 5.6 X10E3/UL (ref 3.4–10.8)

## 2023-09-02 LAB
T3 SERPL-MCNC: 123 NG/DL (ref 71–180)
T4 SERPL-MCNC: 7.9 UG/DL (ref 4.5–12)
THYROPEROXIDASE AB SERPL-ACNC: <9 IU/ML (ref 0–34)
WRITTEN AUTHORIZATION: NORMAL

## 2023-09-05 ENCOUNTER — TELEPHONE (OUTPATIENT)
Dept: INTERNAL MEDICINE | Facility: CLINIC | Age: 43
End: 2023-09-05
Payer: COMMERCIAL

## 2023-09-05 DIAGNOSIS — R79.89 ABNORMAL TSH: Primary | ICD-10-CM

## 2023-09-05 NOTE — TELEPHONE ENCOUNTER
PT called to make an appt for Oct for Labs as he said his TSH was abnormal.  Did not see any active labs, but I know we have until Oct 2nd.

## 2023-10-02 ENCOUNTER — LAB (OUTPATIENT)
Dept: INTERNAL MEDICINE | Facility: CLINIC | Age: 43
End: 2023-10-02
Payer: COMMERCIAL

## 2024-03-14 ENCOUNTER — TELEPHONE (OUTPATIENT)
Dept: GASTROENTEROLOGY | Facility: CLINIC | Age: 44
End: 2024-03-14
Payer: COMMERCIAL

## 2024-03-14 NOTE — TELEPHONE ENCOUNTER
Pt called me just now because he rec'd my letter about it being time to schedule a colonoscopy due to his family history. He is due for a repeat endo next year. He is asking if you though it would be ok to wait until next year and just have both at once? He is trying to avoid having 1 procedure this year and 1 next year-he is even agreeable to having them both this year if you think that is appropriate? I told him I katarzyna send you a message and call him back with your recommendations.

## 2024-03-14 NOTE — TELEPHONE ENCOUNTER
Yes, I understand where he is coming from.  He is due for repeat endoscopy 3/2025.  The guidelines for family history of colon cancer/colon polyps would be 5 years.  His last colonoscopy was done at outside institution in 2019--I do not know the month.  I do not have records.  As such, I cannot say with 100% certainty whether or not he could wait.  I can tell him that there are many other patients that have their procedure delayed by a short amount of time and do well but I cannot guarantee that that would be his outcome.  It all comes down to his level of comfort making the decision.    Sakshi Navarro MD

## 2024-03-15 NOTE — TELEPHONE ENCOUNTER
Pt called me back just now and I spoke to him re: Dr. Navarro's recommendations. He tells me he isn't having any problems at this time and feels comfortable waiting until next year to have both procedures. He knows to call me back if he develops any problems. Otherwise, I have updated his recall for him to be due both endo/colon 3/2025-he knows he will get a letter about 2 months ahead of time reminding him to call office to schedule appt. Pt advised to call me back with any further questions/problems.

## 2024-03-15 NOTE — TELEPHONE ENCOUNTER
Tried to call pt re: Dr. Navarro's recommendations-was unable to reach him so I left detailed VM for him with the recommendations. I did advise on VM that ultimately it was his decision and to think about/decide what he is most comfortable with at this time. I left him my direct line to call me back with that decision or any further questions. He is on my recall list and if I haven't heard back from him by the time I go through it again, I will call him to regroup.

## 2024-09-05 ENCOUNTER — OFFICE VISIT (OUTPATIENT)
Dept: INTERNAL MEDICINE | Facility: CLINIC | Age: 44
End: 2024-09-05
Payer: COMMERCIAL

## 2024-09-05 VITALS
TEMPERATURE: 98.2 F | HEIGHT: 70 IN | SYSTOLIC BLOOD PRESSURE: 137 MMHG | OXYGEN SATURATION: 99 % | HEART RATE: 70 BPM | DIASTOLIC BLOOD PRESSURE: 84 MMHG | WEIGHT: 243.8 LBS | BODY MASS INDEX: 34.9 KG/M2 | RESPIRATION RATE: 17 BRPM

## 2024-09-05 DIAGNOSIS — Z00.00 ROUTINE GENERAL MEDICAL EXAMINATION AT A HEALTH CARE FACILITY: Primary | ICD-10-CM

## 2024-09-05 DIAGNOSIS — E55.9 VITAMIN D DEFICIENCY: ICD-10-CM

## 2024-09-05 PROCEDURE — 99396 PREV VISIT EST AGE 40-64: CPT | Performed by: NURSE PRACTITIONER

## 2024-09-05 NOTE — PROGRESS NOTES
Subjective     Chief Complaint   Patient presents with    Annual Exam       History of Present Illness    Pt comes in today for his annual exam. He denies any chest pain or shortness of breath. No nocturia. No bowel dysfunction. Carries a history of Bill's esophagus and is due for endoscopy in March 2023. His energy level is normal.       Past Medical History:   Past Medical History:   Diagnosis Date    Bill's esophagus     Bill's esophagus     Family history of colon cancer     Family history of colonic polyps     GERD (gastroesophageal reflux disease)     Hyperthyroidism      Past Surgical History:  Past Surgical History:   Procedure Laterality Date    ENDOSCOPY N/A 1/3/2022    Esophageal mucosal changes suspicious for short-segment Bill's esophagus-biopsied; Small HH; Widely patent Schatzki ring; Normal stomach; Normal examined duodenum    ENDOSCOPY N/A 3/30/2022    Procedure: ESOPHAGOGASTRODUODENOSCOPY WITH ANESTHESIA;  Surgeon: Sakshi Navarro MD;  Location: [facility] ENDOSCOPY;  Service: Gastroenterology;  Laterality: N/A;  pre bill's  post Terese Sin,     KNEE ACL RECONSTRUCTION Right     SINUS SURGERY      TONSILLECTOMY       Social History:  reports that he quit smoking about 20 years ago. His smoking use included cigarettes. He started smoking about 23 years ago. He has a 1.5 pack-year smoking history. He quit smokeless tobacco use about 20 years ago.  His smokeless tobacco use included snuff. He reports that he does not currently use alcohol. He reports that he does not use drugs.    Family History: family history includes Colon cancer in his paternal uncle; Colon polyps in his brother, father, mother, and paternal uncle; Hypothyroidism in his father and mother.       Allergies:  Allergies   Allergen Reactions    Penicillins Hives     Medications:  Prior to Admission medications    Not on File       Objective     Vital Signs: /84 (BP Location: Right arm,  "Patient Position: Sitting, Cuff Size: Large Adult)   Pulse 70   Temp 98.2 °F (36.8 °C) (Skin)   Resp 17   Ht 177.8 cm (70\")   Wt 111 kg (243 lb 12.8 oz)   SpO2 99%   BMI 34.98 kg/m²     Physical Exam    Physical Exam  Vitals reviewed.   Constitutional:       Appearance: He is well-developed. He is obese.   HENT:      Head: Normocephalic and atraumatic.   Eyes:      Pupils: Pupils are equal, round, and reactive to light.   Neck:      Vascular: No JVD.   Cardiovascular:      Rate and Rhythm: Normal rate and regular rhythm.   Pulmonary:      Effort: Pulmonary effort is normal.   Abdominal:      General: Bowel sounds are normal.      Palpations: Abdomen is soft.   Musculoskeletal:         General: No deformity.      Cervical back: Normal range of motion and neck supple.   Lymphadenopathy:      Cervical: No cervical adenopathy.   Skin:     General: Skin is warm and dry.   Neurological:      Mental Status: He is alert and oriented to person, place, and time.   Psychiatric:         Behavior: Behavior normal.         Thought Content: Thought content normal.         Judgment: Judgment normal.        BMI is >= 30 and <35. (Class 1 Obesity). The following options were offered after discussion;: exercise counseling/recommendations and nutrition counseling/recommendations    Results Reviewed:  Glucose   Date Value Ref Range Status   08/31/2023 88 70 - 99 mg/dL Final     BUN   Date Value Ref Range Status   08/31/2023 14 6 - 24 mg/dL Final     Creatinine   Date Value Ref Range Status   08/31/2023 0.89 0.76 - 1.27 mg/dL Final     Sodium   Date Value Ref Range Status   08/31/2023 140 134 - 144 mmol/L Final     Potassium   Date Value Ref Range Status   08/31/2023 4.5 3.5 - 5.2 mmol/L Final     Chloride   Date Value Ref Range Status   08/31/2023 103 96 - 106 mmol/L Final     Total CO2   Date Value Ref Range Status   08/31/2023 23 20 - 29 mmol/L Final     Calcium   Date Value Ref Range Status   08/31/2023 8.6 (L) 8.7 - 10.2 " mg/dL Final     ALT (SGPT)   Date Value Ref Range Status   08/31/2023 19 0 - 44 IU/L Final     AST (SGOT)   Date Value Ref Range Status   08/31/2023 20 0 - 40 IU/L Final     WBC   Date Value Ref Range Status   08/31/2023 5.6 3.4 - 10.8 x10E3/uL Final     Hematocrit   Date Value Ref Range Status   08/31/2023 49.3 37.5 - 51.0 % Final     Platelets   Date Value Ref Range Status   08/31/2023 199 150 - 450 x10E3/uL Final     Triglycerides   Date Value Ref Range Status   08/31/2023 43 0 - 149 mg/dL Final     HDL Cholesterol   Date Value Ref Range Status   08/31/2023 52 >39 mg/dL Final     LDL Chol Calc (NIH)   Date Value Ref Range Status   08/31/2023 86 0 - 99 mg/dL Final     Hemoglobin A1C   Date Value Ref Range Status   08/31/2023 5.3 4.8 - 5.6 % Final     Comment:              Prediabetes: 5.7 - 6.4           Diabetes: >6.4           Glycemic control for adults with diabetes: <7.0         Results        Assessment / Plan     Assessment/Plan:  Diagnoses and all orders for this visit:    1. Routine general medical examination at a health care facility (Primary)  -     CBC & Differential  -     Comprehensive Metabolic Panel  -     Lipid Panel  -     TSH  -     Vitamin B12  -     Hemoglobin A1c  -     T4, Free    2. Vitamin D deficiency  -     Vitamin D,25-Hydroxy      Assessment & Plan        Will have lab work here today. Discussed weight management and importance of maintaining a healthy weight. Discussed Vitamin D intake and the importance of adequate vitamin D for both Bone Health and a healthy immune system.  Discussed daily exercise and the importance of same, in regards to a healthy heart as well as helping to maintain his weight and improving his mental health.  BMI is elevated. Colonoscopy is up to date.      Return in about 1 year (around 9/5/2025). unless patient needs to be seen sooner or acute issues arise.    Code Status: Full.   Patient or patient representative verbalized consent for the use of Ambient  Listening during the visit with  NEWTON Willams for chart documentation. 9/5/2024  08:13 CDT  I have discussed the patient results/orders and and plan/recommendation with them at today's visit.      Signed by:    NEWTON Willams Date: 09/05/24

## 2024-09-06 LAB
25(OH)D3+25(OH)D2 SERPL-MCNC: 51.1 NG/ML (ref 30–100)
ALBUMIN SERPL-MCNC: 4.7 G/DL (ref 4.1–5.1)
ALP SERPL-CCNC: 60 IU/L (ref 44–121)
ALT SERPL-CCNC: 19 IU/L (ref 0–44)
AST SERPL-CCNC: 17 IU/L (ref 0–40)
BASOPHILS # BLD AUTO: 0 X10E3/UL (ref 0–0.2)
BASOPHILS NFR BLD AUTO: 1 %
BILIRUB SERPL-MCNC: 0.7 MG/DL (ref 0–1.2)
BUN SERPL-MCNC: 11 MG/DL (ref 6–24)
BUN/CREAT SERPL: 11 (ref 9–20)
CALCIUM SERPL-MCNC: 9.6 MG/DL (ref 8.7–10.2)
CHLORIDE SERPL-SCNC: 101 MMOL/L (ref 96–106)
CHOLEST SERPL-MCNC: 170 MG/DL (ref 100–199)
CO2 SERPL-SCNC: 25 MMOL/L (ref 20–29)
CREAT SERPL-MCNC: 0.97 MG/DL (ref 0.76–1.27)
EGFRCR SERPLBLD CKD-EPI 2021: 101 ML/MIN/1.73
EOSINOPHIL # BLD AUTO: 0.1 X10E3/UL (ref 0–0.4)
EOSINOPHIL NFR BLD AUTO: 1 %
ERYTHROCYTE [DISTWIDTH] IN BLOOD BY AUTOMATED COUNT: 12.5 % (ref 11.6–15.4)
GLOBULIN SER CALC-MCNC: 2.4 G/DL (ref 1.5–4.5)
GLUCOSE SERPL-MCNC: 74 MG/DL (ref 70–99)
HBA1C MFR BLD: 5.1 % (ref 4.8–5.6)
HCT VFR BLD AUTO: 51.7 % (ref 37.5–51)
HDLC SERPL-MCNC: 56 MG/DL
HGB BLD-MCNC: 16.7 G/DL (ref 13–17.7)
IMM GRANULOCYTES # BLD AUTO: 0 X10E3/UL (ref 0–0.1)
IMM GRANULOCYTES NFR BLD AUTO: 1 %
LDLC SERPL CALC-MCNC: 104 MG/DL (ref 0–99)
LYMPHOCYTES # BLD AUTO: 1.3 X10E3/UL (ref 0.7–3.1)
LYMPHOCYTES NFR BLD AUTO: 32 %
MCH RBC QN AUTO: 31 PG (ref 26.6–33)
MCHC RBC AUTO-ENTMCNC: 32.3 G/DL (ref 31.5–35.7)
MCV RBC AUTO: 96 FL (ref 79–97)
MONOCYTES # BLD AUTO: 0.4 X10E3/UL (ref 0.1–0.9)
MONOCYTES NFR BLD AUTO: 11 %
NEUTROPHILS # BLD AUTO: 2.2 X10E3/UL (ref 1.4–7)
NEUTROPHILS NFR BLD AUTO: 54 %
PLATELET # BLD AUTO: 208 X10E3/UL (ref 150–450)
POTASSIUM SERPL-SCNC: 4.6 MMOL/L (ref 3.5–5.2)
PROT SERPL-MCNC: 7.1 G/DL (ref 6–8.5)
RBC # BLD AUTO: 5.38 X10E6/UL (ref 4.14–5.8)
SODIUM SERPL-SCNC: 140 MMOL/L (ref 134–144)
T4 FREE SERPL-MCNC: 1.6 NG/DL (ref 0.82–1.77)
TRIGL SERPL-MCNC: 48 MG/DL (ref 0–149)
TSH SERPL DL<=0.005 MIU/L-ACNC: 0.24 UIU/ML (ref 0.45–4.5)
VIT B12 SERPL-MCNC: 1304 PG/ML (ref 232–1245)
VLDLC SERPL CALC-MCNC: 10 MG/DL (ref 5–40)
WBC # BLD AUTO: 4 X10E3/UL (ref 3.4–10.8)

## 2024-09-08 LAB
THYROPEROXIDASE AB SERPL-ACNC: <9 IU/ML (ref 0–34)
WRITTEN AUTHORIZATION: NORMAL

## 2025-03-03 ENCOUNTER — OFFICE VISIT (OUTPATIENT)
Dept: GASTROENTEROLOGY | Facility: CLINIC | Age: 45
End: 2025-03-03
Payer: COMMERCIAL

## 2025-03-03 VITALS
HEART RATE: 72 BPM | SYSTOLIC BLOOD PRESSURE: 142 MMHG | TEMPERATURE: 97.7 F | WEIGHT: 247 LBS | HEIGHT: 70 IN | BODY MASS INDEX: 35.36 KG/M2 | DIASTOLIC BLOOD PRESSURE: 82 MMHG | OXYGEN SATURATION: 99 %

## 2025-03-03 DIAGNOSIS — Z80.0 FAMILY HX OF COLON CANCER: ICD-10-CM

## 2025-03-03 DIAGNOSIS — Z83.719 FAMILY HISTORY OF POLYPS IN THE COLON: ICD-10-CM

## 2025-03-03 DIAGNOSIS — K22.70 BARRETT'S ESOPHAGUS WITHOUT DYSPLASIA: Primary | ICD-10-CM

## 2025-03-03 PROCEDURE — 99214 OFFICE O/P EST MOD 30 MIN: CPT | Performed by: NURSE PRACTITIONER

## 2025-03-03 RX ORDER — ELECTROLYTES/DEXTROSE
1 SOLUTION, ORAL ORAL DAILY
COMMUNITY

## 2025-03-03 NOTE — PROGRESS NOTES
Primary Physician: Reny Brown APRN    Chief Complaint   Patient presents with    Endoscopy     Pt presents today for endo recall-last endo was 3/30/2022; Thompson's    Colon Cancer Screening     Pt also presents today for evaluation for colonoscopy-pt's last colon was 5/30/2019 by Dr. Ortega; Family history of colon cancer and colon polyps; Pt was due for colon last year but wanted to wait until this year to have done at same time as endo        Subjective     Moe Meléndez is a 44 y.o. male.    HPI  Thompson's esophagus  Patient with a history of Thompson's esophagus short segment in nature.  3/30/2022 most recent endoscopy showing normal stomach and duodenum.  Esophageal mucosal changes secondary to Thompson's.    No dysphagia  He denies any breakthrough acid reflux.  He states he stopped his Protonix about 2 years ago. He decided that since he was asymptomatic he decided to stop the PPI given fear of long term side effects of PPI.      Family Hx Colon Cancer  Pt here to set up surveillance colonoscopy given family hx colon cancer.  Pt denies any change in bowel habits. No diarrhea, constipation, abd pain or rectal bleeding.    5/30/2019 colonoscopy in [facility] unremarkable.    Mother, father and borther have had colon polyps. His paternal uncle who has had colon polyps and colon cancer as well.    Past Medical History:   Diagnosis Date    Thompson's esophagus     Family history of colon cancer     Family history of colonic polyps     GERD (gastroesophageal reflux disease)     Hyperthyroidism        Past Surgical History:   Procedure Laterality Date    COLONOSCOPY  05/30/2019    Dr. Ortega-Normal colon and terminal ileum-biopsies taken from right colon; Grade 2 internal hemorrhoids    ENDOSCOPY N/A 01/03/2022    Esophageal mucosal changes suspicious for short-segment Thompson's esophagus-biopsied; Small HH; Widely patent Schatzki ring; Normal stomach; Normal examined duodenum    ENDOSCOPY N/A  2022    Esophageal mucosal changes secondary to established short-segment Thompson's disease-biopsied; Normal stomach; Normal examined duodenum; Repeat 3 years    ENDOSCOPY  2019    Dr. Ortega-Irregular Z-line at 40cm-biopsied; Superficial diminuitive erosions in the proximal antrum-biopsied; Normal duodenal mucosa-biopsied    KNEE ACL RECONSTRUCTION Right     SINUS SURGERY      TONSILLECTOMY          Current Outpatient Medications:     multivitamin with minerals (Multivitamin Adult) tablet tablet, Take 1 tablet by mouth Daily., Disp: , Rfl:     Allergies   Allergen Reactions    Penicillins Hives       Social History     Socioeconomic History    Marital status:    Tobacco Use    Smoking status: Former     Current packs/day: 0.00     Average packs/day: 0.5 packs/day for 3.0 years (1.5 ttl pk-yrs)     Types: Cigarettes     Start date:      Quit date:      Years since quittin.1    Smokeless tobacco: Former     Types: Snuff     Quit date:    Vaping Use    Vaping status: Never Used   Substance and Sexual Activity    Alcohol use: Not Currently     Alcohol/week: 0.0 standard drinks of alcohol    Drug use: Never    Sexual activity: Yes       Family History   Problem Relation Age of Onset    Hypothyroidism Mother     Colon polyps Mother         < 60 years of age    Hypothyroidism Father     Colon polyps Father         < 60 years of age    Colon cancer Paternal Uncle         In his 40's    Colon polyps Paternal Uncle         < 60 years of age    Colon polyps Brother         < 60 years old    Esophageal cancer Neg Hx     Liver cancer Neg Hx     Liver disease Neg Hx     Rectal cancer Neg Hx     Stomach cancer Neg Hx        Review of Systems   Constitutional:  Negative for unexpected weight change.   HENT:  Negative for trouble swallowing.    Respiratory:  Negative for shortness of breath.    Cardiovascular:  Negative for chest pain.       Objective     /82 (BP Location: Left arm,  "Patient Position: Sitting, Cuff Size: Adult)   Pulse 72   Temp 97.7 °F (36.5 °C) (Infrared)   Ht 177.8 cm (70\")   Wt 112 kg (247 lb)   SpO2 99%   BMI 35.44 kg/m²     Physical Exam  Vitals reviewed.   Constitutional:       Appearance: Normal appearance.   Cardiovascular:      Rate and Rhythm: Normal rate and regular rhythm.      Heart sounds: Normal heart sounds.   Pulmonary:      Effort: Pulmonary effort is normal.      Breath sounds: Normal breath sounds.   Neurological:      Mental Status: He is alert.         Lab Results - Last 18 Months   Lab Units 09/05/24  0732   GLUCOSE mg/dL 74   BUN mg/dL 11   CREATININE mg/dL 0.97   SODIUM mmol/L 140   POTASSIUM mmol/L 4.6   CHLORIDE mmol/L 101   CO2 mmol/L 25   TOTAL PROTEIN g/dL 7.1   ALBUMIN g/dL 4.7   ALT (SGPT) IU/L 19   AST (SGOT) IU/L 17   ALK PHOS IU/L 60   BILIRUBIN mg/dL 0.7   GLOBULINREF g/dL 2.4       Lab Results - Last 18 Months   Lab Units 09/05/24  0732   HEMOGLOBIN g/dL 16.7   HEMATOCRIT % 51.7*   MCV fL 96   WBC x10E3/uL 4.0   RDW % 12.5   PLATELETS x10E3/uL 208       Lab Results - Last 18 Months   Lab Units 09/05/24  0732 10/02/23  0652   TSH uIU/mL 0.244* 0.330*   VIT D 25 HYDROXY ng/mL 51.1  --           IMPRESSION/PLAN:    Assessment & Plan      Problem List Items Addressed This Visit       Thompson's esophagus without dysplasia - Primary    Overview     Asymptomatic and pt has NOT taken any PPI for about 2 years (we were unaware)    Seen on endoscopy 1/2022 to assess dysphagia.  1 column of columnar epithelium seen extending upwards and proven to be Thompson's esophagus on biopsy.  I was concerned about the edges but biopsies were normal.  Repeat endoscopy 3/2022 with biopsies does not show any dysplasia as well.  Repeat endoscopy 3/2025.    Anti-reflux measures were reviewed and discussed with patient.  They were advised to refrain from chocolate, alcohol, smoking, peppermint and caffeine.  They were advised to limit fatty foods, large meals, and " eating late at nighttime.  They were advised to reach an ideal body mass index.             Current Assessment & Plan     Plan Endoscopy for surveillance of Thompson's.  I have educated on our recommendation of daily use of PPI for a history of Thompson's Esophagus.         Relevant Orders    Case Request (Completed)    Follow Anesthesia Guidelines / Protocol    Family history of polyps in the colon    Overview     Mother, father and borther have had colon polyps. His paternal uncle who has had colon polyps and colon cancer as well.  His last colonoscopy was done at [facility] in [place] in 2019.  He was told he would need a repeat colonoscopy in 5/2024.         Current Assessment & Plan     Plan Colonoscopy per Dr Navarro         Family hx of colon cancer    Overview     Uncle in 40's         Relevant Orders    Case Request (Completed)    Follow Anesthesia Guidelines / Protocol     Endoscopy and colonoscopy per Dr. Navarro  Miralax Prep          ..The risks, benefits, and alternatives of colonoscopy were reviewed with the patient today.  Risks including perforation of the colon possibly requiring surgery or colostomy.  Additional risks include risk of bleeding from biopsies or removal of colon tissue.  There is also the risk of a drug reaction or problems with anesthesia.  This will be discussed with the further by the anesthesia team on the day of the procedure.  Lastly there is a possibility of missing a colon polyp or cancer.  The benefits include the diagnosis and management of disease of the colon and rectum.  Alternatives to colonoscopy include barium enema, laboratory testing, radiographic evaluation, or no intervention.  The patient verbalizes understanding and agrees.    In accordance with requirements under the Affordable Care Act, [facility] has provided pricing for all hospital services and items on each of its websites. However, a patient's actual cost may differ based on the services the patient receives to  meet individual healthcare needs and based on the benefits provided under the patient’s insurance coverage.        Helena Arellano, APRN  03/03/25  12:38 CST    Part of this note may be an electronic transcription/translation of spoken language to printed text.

## 2025-03-03 NOTE — H&P (VIEW-ONLY)
Primary Physician: Reny Brown APRN    Chief Complaint   Patient presents with    Endoscopy     Pt presents today for endo recall-last endo was 3/30/2022; Thompson's    Colon Cancer Screening     Pt also presents today for evaluation for colonoscopy-pt's last colon was 5/30/2019 by Dr. Ortega; Family history of colon cancer and colon polyps; Pt was due for colon last year but wanted to wait until this year to have done at same time as endo        Subjective     Moe Meléndez is a 44 y.o. male.    HPI  Thompson's esophagus  Patient with a history of Tohmpson's esophagus short segment in nature.  3/30/2022 most recent endoscopy showing normal stomach and duodenum.  Esophageal mucosal changes secondary to Thompson's.    No dysphagia  He denies any breakthrough acid reflux.  He states he stopped his Protonix about 2 years ago. He decided that since he was asymptomatic he decided to stop the PPI given fear of long term side effects of PPI.      Family Hx Colon Cancer  Pt here to set up surveillance colonoscopy given family hx colon cancer.  Pt denies any change in bowel habits. No diarrhea, constipation, abd pain or rectal bleeding.    5/30/2019 colonoscopy in [facility] unremarkable.    Mother, father and borther have had colon polyps. His paternal uncle who has had colon polyps and colon cancer as well.    Past Medical History:   Diagnosis Date    Thompson's esophagus     Family history of colon cancer     Family history of colonic polyps     GERD (gastroesophageal reflux disease)     Hyperthyroidism        Past Surgical History:   Procedure Laterality Date    COLONOSCOPY  05/30/2019    Dr. Ortega-Normal colon and terminal ileum-biopsies taken from right colon; Grade 2 internal hemorrhoids    ENDOSCOPY N/A 01/03/2022    Esophageal mucosal changes suspicious for short-segment Thompson's esophagus-biopsied; Small HH; Widely patent Schatzki ring; Normal stomach; Normal examined duodenum    ENDOSCOPY N/A  2022    Esophageal mucosal changes secondary to established short-segment Thompson's disease-biopsied; Normal stomach; Normal examined duodenum; Repeat 3 years    ENDOSCOPY  2019    Dr. Ortega-Irregular Z-line at 40cm-biopsied; Superficial diminuitive erosions in the proximal antrum-biopsied; Normal duodenal mucosa-biopsied    KNEE ACL RECONSTRUCTION Right     SINUS SURGERY      TONSILLECTOMY          Current Outpatient Medications:     multivitamin with minerals (Multivitamin Adult) tablet tablet, Take 1 tablet by mouth Daily., Disp: , Rfl:     Allergies   Allergen Reactions    Penicillins Hives       Social History     Socioeconomic History    Marital status:    Tobacco Use    Smoking status: Former     Current packs/day: 0.00     Average packs/day: 0.5 packs/day for 3.0 years (1.5 ttl pk-yrs)     Types: Cigarettes     Start date:      Quit date:      Years since quittin.1    Smokeless tobacco: Former     Types: Snuff     Quit date:    Vaping Use    Vaping status: Never Used   Substance and Sexual Activity    Alcohol use: Not Currently     Alcohol/week: 0.0 standard drinks of alcohol    Drug use: Never    Sexual activity: Yes       Family History   Problem Relation Age of Onset    Hypothyroidism Mother     Colon polyps Mother         < 60 years of age    Hypothyroidism Father     Colon polyps Father         < 60 years of age    Colon cancer Paternal Uncle         In his 40's    Colon polyps Paternal Uncle         < 60 years of age    Colon polyps Brother         < 60 years old    Esophageal cancer Neg Hx     Liver cancer Neg Hx     Liver disease Neg Hx     Rectal cancer Neg Hx     Stomach cancer Neg Hx        Review of Systems   Constitutional:  Negative for unexpected weight change.   HENT:  Negative for trouble swallowing.    Respiratory:  Negative for shortness of breath.    Cardiovascular:  Negative for chest pain.       Objective     /82 (BP Location: Left arm,  "Patient Position: Sitting, Cuff Size: Adult)   Pulse 72   Temp 97.7 °F (36.5 °C) (Infrared)   Ht 177.8 cm (70\")   Wt 112 kg (247 lb)   SpO2 99%   BMI 35.44 kg/m²     Physical Exam  Vitals reviewed.   Constitutional:       Appearance: Normal appearance.   Cardiovascular:      Rate and Rhythm: Normal rate and regular rhythm.      Heart sounds: Normal heart sounds.   Pulmonary:      Effort: Pulmonary effort is normal.      Breath sounds: Normal breath sounds.   Neurological:      Mental Status: He is alert.         Lab Results - Last 18 Months   Lab Units 09/05/24  0732   GLUCOSE mg/dL 74   BUN mg/dL 11   CREATININE mg/dL 0.97   SODIUM mmol/L 140   POTASSIUM mmol/L 4.6   CHLORIDE mmol/L 101   CO2 mmol/L 25   TOTAL PROTEIN g/dL 7.1   ALBUMIN g/dL 4.7   ALT (SGPT) IU/L 19   AST (SGOT) IU/L 17   ALK PHOS IU/L 60   BILIRUBIN mg/dL 0.7   GLOBULINREF g/dL 2.4       Lab Results - Last 18 Months   Lab Units 09/05/24  0732   HEMOGLOBIN g/dL 16.7   HEMATOCRIT % 51.7*   MCV fL 96   WBC x10E3/uL 4.0   RDW % 12.5   PLATELETS x10E3/uL 208       Lab Results - Last 18 Months   Lab Units 09/05/24  0732 10/02/23  0652   TSH uIU/mL 0.244* 0.330*   VIT D 25 HYDROXY ng/mL 51.1  --           IMPRESSION/PLAN:    Assessment & Plan      Problem List Items Addressed This Visit       Thompson's esophagus without dysplasia - Primary    Overview     Asymptomatic and pt has NOT taken any PPI for about 2 years (we were unaware)    Seen on endoscopy 1/2022 to assess dysphagia.  1 column of columnar epithelium seen extending upwards and proven to be Thompson's esophagus on biopsy.  I was concerned about the edges but biopsies were normal.  Repeat endoscopy 3/2022 with biopsies does not show any dysplasia as well.  Repeat endoscopy 3/2025.    Anti-reflux measures were reviewed and discussed with patient.  They were advised to refrain from chocolate, alcohol, smoking, peppermint and caffeine.  They were advised to limit fatty foods, large meals, and " eating late at nighttime.  They were advised to reach an ideal body mass index.             Current Assessment & Plan     Plan Endoscopy for surveillance of Thompson's.  I have educated on our recommendation of daily use of PPI for a history of Thompson's Esophagus.         Relevant Orders    Case Request (Completed)    Follow Anesthesia Guidelines / Protocol    Family history of polyps in the colon    Overview     Mother, father and borther have had colon polyps. His paternal uncle who has had colon polyps and colon cancer as well.  His last colonoscopy was done at [facility] in [place] in 2019.  He was told he would need a repeat colonoscopy in 5/2024.         Current Assessment & Plan     Plan Colonoscopy per Dr Navarro         Family hx of colon cancer    Overview     Uncle in 40's         Relevant Orders    Case Request (Completed)    Follow Anesthesia Guidelines / Protocol     Endoscopy and colonoscopy per Dr. Navarro  Miralax Prep          ..The risks, benefits, and alternatives of colonoscopy were reviewed with the patient today.  Risks including perforation of the colon possibly requiring surgery or colostomy.  Additional risks include risk of bleeding from biopsies or removal of colon tissue.  There is also the risk of a drug reaction or problems with anesthesia.  This will be discussed with the further by the anesthesia team on the day of the procedure.  Lastly there is a possibility of missing a colon polyp or cancer.  The benefits include the diagnosis and management of disease of the colon and rectum.  Alternatives to colonoscopy include barium enema, laboratory testing, radiographic evaluation, or no intervention.  The patient verbalizes understanding and agrees.    In accordance with requirements under the Affordable Care Act, [facility] has provided pricing for all hospital services and items on each of its websites. However, a patient's actual cost may differ based on the services the patient receives to  meet individual healthcare needs and based on the benefits provided under the patient’s insurance coverage.        Helena Arellano, APRN  03/03/25  12:38 CST    Part of this note may be an electronic transcription/translation of spoken language to printed text.

## 2025-03-03 NOTE — ASSESSMENT & PLAN NOTE
Plan Endoscopy for surveillance of Thompson's.  I have educated on our recommendation of daily use of PPI for a history of Thompson's Esophagus.

## 2025-03-20 ENCOUNTER — HOSPITAL ENCOUNTER (OUTPATIENT)
Facility: HOSPITAL | Age: 45
Setting detail: HOSPITAL OUTPATIENT SURGERY
Discharge: HOME | End: 2025-03-20
Attending: INTERNAL MEDICINE | Admitting: INTERNAL MEDICINE
Payer: COMMERCIAL

## 2025-03-20 ENCOUNTER — ANESTHESIA EVENT (OUTPATIENT)
Dept: GASTROENTEROLOGY | Facility: HOSPITAL | Age: 45
End: 2025-03-20
Payer: COMMERCIAL

## 2025-03-20 ENCOUNTER — ANESTHESIA (OUTPATIENT)
Dept: GASTROENTEROLOGY | Facility: HOSPITAL | Age: 45
End: 2025-03-20
Payer: COMMERCIAL

## 2025-03-20 ENCOUNTER — TELEPHONE (OUTPATIENT)
Dept: GASTROENTEROLOGY | Facility: CLINIC | Age: 45
End: 2025-03-20
Payer: COMMERCIAL

## 2025-03-20 VITALS
HEART RATE: 88 BPM | OXYGEN SATURATION: 100 % | BODY MASS INDEX: 34.65 KG/M2 | RESPIRATION RATE: 20 BRPM | HEIGHT: 70 IN | DIASTOLIC BLOOD PRESSURE: 87 MMHG | WEIGHT: 242 LBS | SYSTOLIC BLOOD PRESSURE: 125 MMHG | TEMPERATURE: 97.7 F

## 2025-03-20 DIAGNOSIS — K22.70 BARRETT'S ESOPHAGUS WITHOUT DYSPLASIA: ICD-10-CM

## 2025-03-20 DIAGNOSIS — Z80.0 FAMILY HX OF COLON CANCER: ICD-10-CM

## 2025-03-20 PROCEDURE — 25010000002 LIDOCAINE PF 2% 2 % SOLUTION

## 2025-03-20 PROCEDURE — 25010000002 PROPOFOL 10 MG/ML EMULSION

## 2025-03-20 PROCEDURE — 88305 TISSUE EXAM BY PATHOLOGIST: CPT | Performed by: INTERNAL MEDICINE

## 2025-03-20 PROCEDURE — 25810000003 SODIUM CHLORIDE 0.9 % SOLUTION: Performed by: ANESTHESIOLOGY

## 2025-03-20 PROCEDURE — 45378 DIAGNOSTIC COLONOSCOPY: CPT | Performed by: INTERNAL MEDICINE

## 2025-03-20 PROCEDURE — 43239 EGD BIOPSY SINGLE/MULTIPLE: CPT | Performed by: INTERNAL MEDICINE

## 2025-03-20 RX ORDER — SODIUM CHLORIDE 9 MG/ML
500 INJECTION, SOLUTION INTRAVENOUS CONTINUOUS PRN
Status: DISCONTINUED | OUTPATIENT
Start: 2025-03-20 | End: 2025-03-20 | Stop reason: HOSPADM

## 2025-03-20 RX ORDER — SODIUM CHLORIDE 0.9 % (FLUSH) 0.9 %
10 SYRINGE (ML) INJECTION AS NEEDED
Status: DISCONTINUED | OUTPATIENT
Start: 2025-03-20 | End: 2025-03-20 | Stop reason: HOSPADM

## 2025-03-20 RX ORDER — LIDOCAINE HYDROCHLORIDE 20 MG/ML
INJECTION, SOLUTION EPIDURAL; INFILTRATION; INTRACAUDAL; PERINEURAL AS NEEDED
Status: DISCONTINUED | OUTPATIENT
Start: 2025-03-20 | End: 2025-03-20 | Stop reason: SURG

## 2025-03-20 RX ORDER — LIDOCAINE HYDROCHLORIDE 10 MG/ML
0.5 INJECTION, SOLUTION EPIDURAL; INFILTRATION; INTRACAUDAL; PERINEURAL ONCE AS NEEDED
Status: DISCONTINUED | OUTPATIENT
Start: 2025-03-20 | End: 2025-03-20 | Stop reason: HOSPADM

## 2025-03-20 RX ORDER — PROPOFOL 10 MG/ML
VIAL (ML) INTRAVENOUS AS NEEDED
Status: DISCONTINUED | OUTPATIENT
Start: 2025-03-20 | End: 2025-03-20 | Stop reason: SURG

## 2025-03-20 RX ADMIN — LIDOCAINE HYDROCHLORIDE 100 MG: 20 INJECTION, SOLUTION EPIDURAL; INFILTRATION; INTRACAUDAL; PERINEURAL at 13:42

## 2025-03-20 RX ADMIN — SODIUM CHLORIDE 500 ML: 0.9 INJECTION, SOLUTION INTRAVENOUS at 11:14

## 2025-03-20 RX ADMIN — PROPOFOL 730 MG: 10 INJECTION, EMULSION INTRAVENOUS at 13:42

## 2025-03-20 NOTE — ANESTHESIA POSTPROCEDURE EVALUATION
Patient: Moe Meléndez    Procedure Summary       Date: 03/20/25 Room / Location:  [facility] /  [facility]    Anesthesia Start: 1338 Anesthesia Stop: 1413    Procedures:       ESOPHAGOGASTRODUODENOSCOPY WITH ANESTHESIA      COLONOSCOPY WITH ANESTHESIA Diagnosis:       Family hx of colon cancer      Thompson's esophagus without dysplasia      (Family hx of colon cancer [Z80.0])      (Thompson's esophagus without dysplasia [K22.70])    Surgeons: Sakshi Navarro MD Provider: Rupal Alfonso CRNA    Anesthesia Type: MAC ASA Status: 2            Anesthesia Type: MAC    Vitals  Vitals Value Taken Time   /90 03/20/25 14:11   Temp     Pulse 99 03/20/25 14:13   Resp 22 03/20/25 14:11   SpO2 95 % 03/20/25 14:13   Vitals shown include unfiled device data.        Post Anesthesia Care and Evaluation    Patient location during evaluation: bedside  Patient participation: complete - patient participated  Level of consciousness: awake and alert  Pain management: adequate    Airway patency: patent  Anesthetic complications: No anesthetic complications  PONV Status: none  Cardiovascular status: acceptable  Respiratory status: acceptable  Hydration status: acceptable    Comments: Pt discharged from PACU based on amari score >8       No anesthesia care post op

## 2025-03-20 NOTE — ANESTHESIA PREPROCEDURE EVALUATION
Anesthesia Evaluation     Patient summary reviewed   no history of anesthetic complications:   NPO Solid Status: > 8 hours             Airway   Mallampati: II  TM distance: >3 FB  No difficulty expected  Dental - normal exam     Pulmonary    (-) asthma, sleep apnea, not a smoker  Cardiovascular - negative cardio ROS  Exercise tolerance: excellent (>7 METS)    (-) hypertension, hyperlipidemia      Neuro/Psych- negative ROS  (-) seizures, TIA, CVA  GI/Hepatic/Renal/Endo    (+) obesity, GERD, thyroid problem   (-) liver disease, no renal disease, diabetes    Musculoskeletal     Abdominal    Substance History      OB/GYN          Other                      Anesthesia Plan    ASA 2     MAC       Anesthetic plan, risks, benefits, and alternatives have been provided, discussed and informed consent has been obtained with: patient.      CODE STATUS:

## 2025-03-21 LAB
CYTO UR: NORMAL
LAB AP CASE REPORT: NORMAL
LAB AP DIAGNOSIS COMMENT: NORMAL
Lab: NORMAL
PATH REPORT.FINAL DX SPEC: NORMAL
PATH REPORT.GROSS SPEC: NORMAL

## (undated) DEVICE — FRCP BIOP ENDO CAPTURAPRO SPK SERR 2.8MM 230CM

## (undated) DEVICE — CUFF,BP,DISP,1 TUBE,ADULT,HP: Brand: MEDLINE

## (undated) DEVICE — THE CHANNEL CLEANING BRUSH IS A NYLON FLEXI BRUSH ATTACHED TO A FLEXIBLE PLASTIC SHEATH DESIGNED TO SAFELY REMOVE DEBRIS FROM FLEXIBLE ENDOSCOPES.

## (undated) DEVICE — Device: Brand: DEFENDO AIR/WATER/SUCTION AND BIOPSY VALVE

## (undated) DEVICE — MASK,OXYGEN,MED CONC,ADLT,7' TUB, UC: Brand: PENDING

## (undated) DEVICE — CONMED SCOPE SAVER BITE BLOCK, 20X27 MM: Brand: SCOPE SAVER

## (undated) DEVICE — TBG SMPL FLTR LINE NASL 02/C02 A/ BX/100

## (undated) DEVICE — DEFENDO AIR WATER SUCTION AND BIOPSY VALVE KIT FOR  OLYMPUS: Brand: DEFENDO AIR/WATER/SUCTION AND BIOPSY VALVE

## (undated) DEVICE — SENSR O2 OXIMAX FNGR A/ 18IN NONSTR

## (undated) DEVICE — FRCP BX RADJAW4 NDL 2.8 240 STD OG

## (undated) DEVICE — ARGYLE YANKAUER BULB TIP WITH VENT: Brand: ARGYLE

## (undated) DEVICE — YANKAUER,BULB TIP WITH VENT: Brand: ARGYLE